# Patient Record
Sex: FEMALE | Race: WHITE | NOT HISPANIC OR LATINO | Employment: OTHER | URBAN - METROPOLITAN AREA
[De-identification: names, ages, dates, MRNs, and addresses within clinical notes are randomized per-mention and may not be internally consistent; named-entity substitution may affect disease eponyms.]

---

## 2022-06-29 ENCOUNTER — OFFICE VISIT (OUTPATIENT)
Dept: GASTROENTEROLOGY | Facility: CLINIC | Age: 57
End: 2022-06-29

## 2022-06-29 VITALS
WEIGHT: 229 LBS | DIASTOLIC BLOOD PRESSURE: 72 MMHG | HEIGHT: 67 IN | SYSTOLIC BLOOD PRESSURE: 129 MMHG | HEART RATE: 80 BPM | BODY MASS INDEX: 35.94 KG/M2

## 2022-06-29 DIAGNOSIS — R94.5 ABNORMAL RESULTS OF LIVER FUNCTION STUDIES: ICD-10-CM

## 2022-06-29 DIAGNOSIS — K76.0 FATTY LIVER: Primary | ICD-10-CM

## 2022-06-29 DIAGNOSIS — R74.8 ELEVATED LIVER ENZYMES: ICD-10-CM

## 2022-06-29 PROCEDURE — 99204 OFFICE O/P NEW MOD 45 MIN: CPT | Performed by: NURSE PRACTITIONER

## 2022-06-29 RX ORDER — FUROSEMIDE 20 MG/1
TABLET ORAL
COMMUNITY
Start: 2022-06-06

## 2022-06-29 RX ORDER — FLUTICASONE PROPIONATE AND SALMETEROL 100; 50 UG/1; UG/1
POWDER RESPIRATORY (INHALATION)
COMMUNITY
Start: 2022-04-22 | End: 2023-03-20

## 2022-06-29 RX ORDER — ROPINIROLE 2 MG/1
TABLET, FILM COATED ORAL NIGHTLY
COMMUNITY
Start: 2022-06-20

## 2022-06-29 RX ORDER — BUDESONIDE, GLYCOPYRROLATE, AND FORMOTEROL FUMARATE 160; 9; 4.8 UG/1; UG/1; UG/1
AEROSOL, METERED RESPIRATORY (INHALATION)
COMMUNITY
Start: 2022-05-23

## 2022-06-29 RX ORDER — CYCLOBENZAPRINE HCL 10 MG
TABLET ORAL
COMMUNITY
Start: 2022-06-06

## 2022-06-29 RX ORDER — ALBUTEROL SULFATE 90 UG/1
AEROSOL, METERED RESPIRATORY (INHALATION)
COMMUNITY
Start: 2022-06-29

## 2022-06-29 RX ORDER — NYSTATIN 100000 U/G
CREAM TOPICAL
COMMUNITY
Start: 2022-03-26

## 2022-06-29 RX ORDER — AMITRIPTYLINE HYDROCHLORIDE 50 MG/1
50 TABLET, FILM COATED ORAL 2 TIMES DAILY
COMMUNITY
Start: 2022-06-06

## 2022-06-29 RX ORDER — DULOXETIN HYDROCHLORIDE 20 MG/1
20 CAPSULE, DELAYED RELEASE ORAL 2 TIMES DAILY
COMMUNITY
Start: 2022-06-06

## 2022-06-29 RX ORDER — HYDROXYZINE HYDROCHLORIDE 25 MG/1
TABLET, FILM COATED ORAL
COMMUNITY
Start: 2022-06-20 | End: 2023-03-31

## 2022-06-29 RX ORDER — OMEPRAZOLE 20 MG/1
CAPSULE, DELAYED RELEASE ORAL
COMMUNITY
Start: 2022-06-07

## 2022-06-29 RX ORDER — CLOBETASOL PROPIONATE 0.5 MG/G
OINTMENT TOPICAL
COMMUNITY
Start: 2022-05-10

## 2022-06-29 NOTE — PROGRESS NOTES
Chief Complaint  steatosis of liver    Ester Pinon is a 56 y.o. female who presents to [facility] on referral from Amber Sy for a gastroenterology evaluation of fatty liver.      History of Present Illness    She reports recent diagnosis of fatty liver and diabetes.  She was started on metformin.      She reports drinking ETOH - about one shot of moonshine per day.  She does this for about 1/2 of the month until it runs out.  States that she was previously an alcoholic and stopped drinking heavily in 1992.  Denies history of illicit drug use and unprofessional tattoos.      Reports that she's been trying to lose weight.  She's having better luck now that she's started treatment for diabetes.         Past Medical History:   Diagnosis Date   • Diabetes mellitus (HCC)    • Fibromyalgia    • Restless leg        Past Surgical History:   Procedure Laterality Date   • COLONOSCOPY     • UPPER GASTROINTESTINAL ENDOSCOPY           Current Outpatient Medications:   •  albuterol sulfate  (90 Base) MCG/ACT inhaler, albuterol sulfate HFA 90 mcg/actuation aerosol inhaler  INHALE ONE PUFF BY MOUTH INTO LUNGS FOUR TIMES A DAY AS NEEDED FOR SHORTNESS OF AIR, Disp: , Rfl:   •  amitriptyline (ELAVIL) 50 MG tablet, amitriptyline 50 mg tablet  TAKE 1 TO 2 TABLET BY MOUTH DAILY, Disp: , Rfl:   •  Breztri Aerosphere 160-9-4.8 MCG/ACT aerosol inhaler, INHALE 2 SPRAYS INTO THE LUNGS 2 (TWO) TIMES DAILY., Disp: , Rfl:   •  clobetasol (TEMOVATE) 0.05 % ointment, APPLY TOPICALLY TO AFFECTED AREA(S) ON THE BODY TWICE A DAY UNTIL CLEAR., Disp: , Rfl:   •  cyclobenzaprine (FLEXERIL) 10 MG tablet, cyclobenzaprine 10 mg tablet  TAKE ONE TABLET BY MOUTH THREE TIMES A DAY ...MAY ADD SECOND TAB TO ONE DOSE DAILY AS NEEDED, Disp: , Rfl:   •  Diclofenac Sodium (VOLTAREN) 1 % gel gel, Apply 2 g topically to the appropriate area as directed., Disp: , Rfl:   •  DULoxetine (CYMBALTA) 20 MG capsule,  "Take 20 mg by mouth 2 (Two) Times a Day., Disp: , Rfl:   •  Fluticasone-Salmeterol (ADVAIR) 100-50 MCG/ACT DISKUS, INHALE ONE PUFF BY MOUTH INTO LUNGS EVERY TWELVE HOURS, Disp: , Rfl:   •  furosemide (LASIX) 20 MG tablet, TAKE ONE TABLET BY MOUTH EVERY MORNING AND MAY TAKE ONE TABLET IN THE EVENING AS NEEDED FOR SWELLING/ONE TAB MORNING AND IF NEEDED AT NOON, Disp: , Rfl:   •  hydrOXYzine (ATARAX) 25 MG tablet, hydroxyzine HCl 25 mg tablet  TAKE ONE-HALF OR TAKE ONE TABLET BY MOUTH THREE TIMES A DAY AS NEEDED FOR ANXIETY, Disp: , Rfl:   •  metFORMIN (GLUCOPHAGE) 1000 MG tablet, TAKE ONE TABLET BY MOUTH EVERY MORNING AND EVERY EVENING, Disp: , Rfl:   •  nystatin (MYCOSTATIN) 547057 UNIT/GM cream, APPLY TOPICALLY TO AFFECTED AREA(S) TWICE A DAY, Disp: , Rfl:   •  omeprazole (priLOSEC) 20 MG capsule, omeprazole 20 mg capsule,delayed release  TAKE 1 CAPSULE (20 MG TOTAL) BY MOUTH DAILY., Disp: , Rfl:   •  rOPINIRole (REQUIP) 2 MG tablet, , Disp: , Rfl:   •  sertraline (ZOLOFT) 50 MG tablet, sertraline 50 mg tablet  TAKE ONE TABLET BY MOUTH DAILY, Disp: , Rfl:      No Known Allergies    History reviewed. No pertinent family history.     Social History     Social History Narrative   • Not on file       Immunization:  Immunization History   Administered Date(s) Administered   • COVID-19 (PFIZER) PURPLE CAP 12/22/2021, 01/12/2022        Objective       Vital Signs:   /72 (BP Location: Left arm, Patient Position: Sitting, Cuff Size: Adult)   Pulse 80   Ht 170.2 cm (67\")   Wt 104 kg (229 lb)   BMI 35.87 kg/m²       Physical Exam  Constitutional:       General: She is not in acute distress.     Appearance: Normal appearance. She is well-developed and normal weight.   HENT:      Head: Normocephalic and atraumatic.   Eyes:      Conjunctiva/sclera: Conjunctivae normal.      Pupils: Pupils are equal, round, and reactive to light.      Visual Fields: Right eye visual fields normal and left eye visual fields normal. "   Cardiovascular:      Rate and Rhythm: Normal rate and regular rhythm.      Heart sounds: Normal heart sounds.   Pulmonary:      Effort: Pulmonary effort is normal. No retractions.      Breath sounds: Normal breath sounds and air entry.   Abdominal:      General: Bowel sounds are normal.      Palpations: Abdomen is soft.      Tenderness: There is no abdominal tenderness.      Comments: No appreciable hepatosplenomegaly or ascites   Musculoskeletal:         General: Normal range of motion.      Cervical back: Neck supple.      Right lower leg: No edema.      Left lower leg: No edema.   Lymphadenopathy:      Cervical: No cervical adenopathy.   Skin:     General: Skin is warm and dry.      Findings: No lesion.   Neurological:      General: No focal deficit present.      Mental Status: She is alert and oriented to person, place, and time.   Psychiatric:         Mood and Affect: Mood and affect normal.         Behavior: Behavior normal.         Result Review :   The following data was reviewed by: MONIK Ness on 06/29/2022:      CMP    CMP 3/15/22   Glucose 116 (A)   BUN 14   Creatinine 0.7   Sodium 137   Potassium 4.4   Chloride 103   Calcium 8.9   Albumin 4.2   Total Bilirubin 0.37   Alkaline Phosphatase 84   AST (SGOT) 43 (A)   ALT (SGPT) 56 (A)   (A) Abnormal value            5/6/2022 CT abdomen pelvis with and without contrast-decreased attenuation of the liver consistent with steatosis.  Normal gallbladder and biliary system.  Normal visualized stomach, small intestine and colon.                  Assessment and Plan    Diagnoses and all orders for this visit:    1. Fatty liver (Primary)  -     Liver Elastography; Future  -     Hepatitis Panel, Acute  -     ALEX  -     Alpha - 1 - Antitrypsin  -     Anti-Smooth Muscle Antibody Titer  -     Ceruloplasmin  -     Immunofixation, Serum; Future  -     Iron Profile; Future  -     Mitochondrial Antibodies, M2  -     Comprehensive Metabolic Panel; Future    2.  Elevated liver enzymes  -     Hepatitis Panel, Acute  -     ALEX  -     Alpha - 1 - Antitrypsin  -     Anti-Smooth Muscle Antibody Titer  -     Ceruloplasmin  -     Immunofixation, Serum; Future  -     Iron Profile; Future  -     Mitochondrial Antibodies, M2  -     Comprehensive Metabolic Panel; Future    3. Abnormal results of liver function studies   -     Hepatitis Panel, Acute      Encouraged alcohol cessation, compliance with diabetes treatment and low-fat diet.        Follow Up   Return in about 6 months (around 12/29/2022) for fatty liver.  Patient was given instructions and counseling regarding her condition or for health maintenance advice. Please see specific information pulled into the AVS if appropriate.

## 2022-07-08 ENCOUNTER — PATIENT ROUNDING (BHMG ONLY) (OUTPATIENT)
Dept: GASTROENTEROLOGY | Facility: CLINIC | Age: 57
End: 2022-07-08

## 2022-07-08 NOTE — PROGRESS NOTES
7/8/2022      Hello, may I speak with Ester Zi     My name is Loly, Clinical Coordinator. I am calling from [facility].    Before we get started may I verify your date of birth? 1965     I am calling to officially welcome you to our practice and ask about your recent visit. Is this a good time to talk? Yes     Tell me about your visit with us. What things went well? Patient states that things went well. The patient states that she had a great experience all around.       We're always looking for ways to make our patients' experiences even better. Do you have recommendations on ways we may improve? No     Overall were you satisfied with your first visit to our practice? Yes    I appreciate you taking the time to speak with me today. Is there anything else I can do for you? No     We would really appreciate you completing a survey if you receive one in the mail or via email.       Thank you, and have a great day.

## 2022-07-22 ENCOUNTER — PROCEDURE VISIT (OUTPATIENT)
Dept: OTHER | Facility: HOSPITAL | Age: 57
End: 2022-07-22

## 2022-07-22 DIAGNOSIS — K76.0 FATTY LIVER: ICD-10-CM

## 2022-07-22 PROCEDURE — 91200 LIVER ELASTOGRAPHY: CPT | Performed by: NURSE PRACTITIONER

## 2022-07-26 NOTE — PROGRESS NOTES
Liver Elastography  Performed by: Isabella Vivar APRN  Authorized by: Isabella Vivar APRN   Ordering Provider: Isabella Vivar APRN    Probe:  M+  Procedure Details:  Procedure: After providing an oral and written explanation of the Fibroscan vibration controlled transient elastography (VTCE) test procedure to the patient. The patient was placed in supine position with right arm in maximum abduction to allow optimal exposure of right lateral abdomen. Patient was briefly assessed, identifying terminus of the xyphoid process and locating an ideal transient elastography testing site, midline and lateral to this point. Patient was instructed to breathe normally and remain stationary during the test process. Pre-measurement data confirmed the transient elastography probe was centered over the liver parenchyma. A series of ten 50 Hz mechanical pulses were applied with controlled application pressure to induce a mechanical shear wave in the liver tissue. For each measurement, the shear wave propagation speed was detected, displayed and converted to its equivalent liver stiffness value in kilopascals. Skin to liver capsules distance and shear wave characteristics were monitored during the entire examination to assure quality data. Median liver stiffness measurement and interquartile range were calculated and displayed in real time. Acquired measurement data was stored and submitted to the provider for review and interpretation. Patient tolerated the procedure well and was discharged without incident.   Clinical Information:     NPO 3 Hours or More: Yes      Actively Drinking: No    Findings:     Median Liver Stiffness Score:  35.1    Interquartile Range (IQR) to Median Ratio:  6    Zayra Stiffness Consistent with:  F4 Cirrhosis    Current Scan Considered Reliab: Yes      Median Controlled Attenuation Parameter (dB/m):  275    IQR:  8    CAP SCORE:  Moderate/severe liver fat

## 2022-07-28 ENCOUNTER — TELEPHONE (OUTPATIENT)
Dept: GASTROENTEROLOGY | Facility: CLINIC | Age: 57
End: 2022-07-28

## 2022-07-28 NOTE — TELEPHONE ENCOUNTER
I spoke with the lab at [facility] and they say that they never received an order for a Hep Panel, if you would like to put an order in I can call the patient and have them go back for a redraw.

## 2022-07-28 NOTE — TELEPHONE ENCOUNTER
----- Message from MONIK Ness sent at 7/26/2022 12:59 PM EDT -----  Fibroscan is c/w severe fatty liver disease and stiffening of the liver c/w cirrhosis.  Will discuss further at f/u.  Recommend avoidance of high fat foods and alcohol.

## 2022-07-29 ENCOUNTER — TELEPHONE (OUTPATIENT)
Dept: GASTROENTEROLOGY | Facility: CLINIC | Age: 57
End: 2022-07-29

## 2023-01-04 ENCOUNTER — TELEPHONE (OUTPATIENT)
Dept: GASTROENTEROLOGY | Facility: CLINIC | Age: 58
End: 2023-01-04

## 2023-01-04 ENCOUNTER — OFFICE VISIT (OUTPATIENT)
Dept: GASTROENTEROLOGY | Facility: CLINIC | Age: 58
End: 2023-01-04
Payer: MEDICARE

## 2023-01-04 VITALS
WEIGHT: 224 LBS | DIASTOLIC BLOOD PRESSURE: 79 MMHG | HEART RATE: 88 BPM | BODY MASS INDEX: 35.16 KG/M2 | SYSTOLIC BLOOD PRESSURE: 114 MMHG | HEIGHT: 67 IN

## 2023-01-04 DIAGNOSIS — K74.69 OTHER CIRRHOSIS OF LIVER: Primary | ICD-10-CM

## 2023-01-04 DIAGNOSIS — R74.8 ELEVATED LIVER ENZYMES: ICD-10-CM

## 2023-01-04 PROCEDURE — 99213 OFFICE O/P EST LOW 20 MIN: CPT | Performed by: NURSE PRACTITIONER

## 2023-01-04 PROCEDURE — 1160F RVW MEDS BY RX/DR IN RCRD: CPT | Performed by: NURSE PRACTITIONER

## 2023-01-04 PROCEDURE — 1159F MED LIST DOCD IN RCRD: CPT | Performed by: NURSE PRACTITIONER

## 2023-01-04 RX ORDER — ONDANSETRON 4 MG/1
4 TABLET, ORALLY DISINTEGRATING ORAL EVERY 8 HOURS PRN
COMMUNITY
Start: 2022-10-31 | End: 2023-11-01

## 2023-01-04 NOTE — PROGRESS NOTES
Chief Complaint     fatty liver    History of Present Illness     Ester Pinon is a 57 y.o. female who presents to [facility] for follow-up of fatty liver and elevated liver enzymes.      She reports that she stopped drinking ETOH routinely in 1992.  She's only had two ETOH drinks since last visit.  She is diabetic and reports compliance with metformin.      She reports previous EGD and colonsocopy last year at [place].   History      Past Medical History:   Diagnosis Date   • Diabetes mellitus (HCC)    • Fatty liver    • Fibromyalgia    • Restless leg      Past Surgical History:   Procedure Laterality Date   • COLONOSCOPY     • UPPER GASTROINTESTINAL ENDOSCOPY       Family History   Problem Relation Age of Onset   • Colon cancer Neg Hx         Current Medications       Current Outpatient Medications:   •  albuterol sulfate  (90 Base) MCG/ACT inhaler, albuterol sulfate HFA 90 mcg/actuation aerosol inhaler  INHALE ONE PUFF BY MOUTH INTO LUNGS FOUR TIMES A DAY AS NEEDED FOR SHORTNESS OF AIR, Disp: , Rfl:   •  amitriptyline (ELAVIL) 50 MG tablet, amitriptyline 50 mg tablet  TAKE 1 TO 2 TABLET BY MOUTH DAILY, Disp: , Rfl:   •  Breztri Aerosphere 160-9-4.8 MCG/ACT aerosol inhaler, INHALE 2 SPRAYS INTO THE LUNGS 2 (TWO) TIMES DAILY., Disp: , Rfl:   •  clobetasol (TEMOVATE) 0.05 % ointment, APPLY TOPICALLY TO AFFECTED AREA(S) ON THE BODY TWICE A DAY UNTIL CLEAR., Disp: , Rfl:   •  cyclobenzaprine (FLEXERIL) 10 MG tablet, cyclobenzaprine 10 mg tablet  TAKE ONE TABLET BY MOUTH THREE TIMES A DAY ...MAY ADD SECOND TAB TO ONE DOSE DAILY AS NEEDED, Disp: , Rfl:   •  Diclofenac Sodium (VOLTAREN) 1 % gel gel, Apply 2 g topically to the appropriate area as directed., Disp: , Rfl:   •  DULoxetine (CYMBALTA) 20 MG capsule, Take 20 mg by mouth 2 (Two) Times a Day., Disp: , Rfl:   •  Fluticasone-Salmeterol (ADVAIR) 100-50 MCG/ACT DISKUS, INHALE ONE PUFF BY MOUTH INTO LUNGS EVERY TWELVE  HOURS, Disp: , Rfl:   •  furosemide (LASIX) 20 MG tablet, TAKE ONE TABLET BY MOUTH EVERY MORNING AND MAY TAKE ONE TABLET IN THE EVENING AS NEEDED FOR SWELLING/ONE TAB MORNING AND IF NEEDED AT NOON, Disp: , Rfl:   •  hydrOXYzine (ATARAX) 25 MG tablet, hydroxyzine HCl 25 mg tablet  TAKE ONE-HALF OR TAKE ONE TABLET BY MOUTH THREE TIMES A DAY AS NEEDED FOR ANXIETY, Disp: , Rfl:   •  metFORMIN (GLUCOPHAGE) 1000 MG tablet, TAKE ONE TABLET BY MOUTH EVERY MORNING AND EVERY EVENING, Disp: , Rfl:   •  nystatin (MYCOSTATIN) 670266 UNIT/GM cream, APPLY TOPICALLY TO AFFECTED AREA(S) TWICE A DAY, Disp: , Rfl:   •  omeprazole (priLOSEC) 20 MG capsule, omeprazole 20 mg capsule,delayed release  TAKE 1 CAPSULE (20 MG TOTAL) BY MOUTH DAILY., Disp: , Rfl:   •  ondansetron ODT (ZOFRAN-ODT) 4 MG disintegrating tablet, Take 4 mg by mouth., Disp: , Rfl:   •  rOPINIRole (REQUIP) 2 MG tablet, , Disp: , Rfl:   •  sertraline (ZOLOFT) 50 MG tablet, sertraline 50 mg tablet  TAKE ONE TABLET BY MOUTH DAILY, Disp: , Rfl:      Allergies     No Known Allergies    Social History       Social History     Social History Narrative   • Not on file         Objective       /79 (BP Location: Left arm, Patient Position: Sitting, Cuff Size: Adult)   Pulse 88   Ht 170.2 cm (67\")   Wt 102 kg (224 lb)   BMI 35.08 kg/m²       Physical Exam    Results       Result Review :    The following data was reviewed by: MONIK Ness on 01/04/2023:      CMP    CMP 3/15/22   Glucose 116 (A)   BUN 14   Creatinine 0.7   Sodium 137   Potassium 4.4   Chloride 103   Calcium 8.9   Albumin 4.2   Total Bilirubin 0.37   Alkaline Phosphatase 84   AST (SGOT) 43 (A)   ALT (SGPT) 56 (A)   (A) Abnormal value                   Assessment and Plan              Diagnoses and all orders for this visit:    1. Other cirrhosis of liver (HCC) (Primary)  -     CBC Auto Differential; Future  -     Protime-INR; Future  -     Comprehensive Metabolic Panel; Future  -     US  Abdomen Limited; Future    2. Elevated liver enzymes  -     Hepatitis Panel, Acute; Future      Cirrhosis - etiology - previous ETOH and diabetes.    Ascites:  None on exam.    Varices: Requested previous EGD from [facility] [place].   Encephalopathy:  No symptoms at this time.        Follow Up     Follow Up   Return in about 6 months (around 7/4/2023) for Cirrhosis.  Patient was given instructions and counseling regarding her condition or for health maintenance advice. Please see specific information pulled into the AVS if appropriate.

## 2023-01-04 NOTE — TELEPHONE ENCOUNTER
Attempted to call patient left voicemail. US has been scheduled at [place] on 01/25/2023 arrival time at 11:40 am. NPO after midnight no gum or mints.

## 2023-01-23 ENCOUNTER — TELEPHONE (OUTPATIENT)
Dept: GASTROENTEROLOGY | Facility: CLINIC | Age: 58
End: 2023-01-23
Payer: MEDICARE

## 2023-02-02 ENCOUNTER — TELEPHONE (OUTPATIENT)
Dept: GASTROENTEROLOGY | Facility: CLINIC | Age: 58
End: 2023-02-02
Payer: MEDICARE

## 2023-02-02 NOTE — TELEPHONE ENCOUNTER
Patient notified of results and is not experiencing abdominal pain at this time but will let us know if she does.

## 2023-02-02 NOTE — TELEPHONE ENCOUNTER
----- Message from MONIK Ness sent at 2/1/2023  1:05 PM EST -----  Please let pt know that US shows fatty liver and stones in the gallbladder.  Is she experiencing abdominal pain?  If so, recommend surgical referral.  If no pain, okay to continue to monitor.

## 2023-02-03 NOTE — TELEPHONE ENCOUNTER
Patient called the office and left a voicemail at 9:18 am about surgery for gallbladder.     Returned patient voicemail, patient stated that she is still worried but she is ready for the referral to surgery. Informed patient that the provider would have to place a referral but once that was placed we would contact her and she would be set up for a consultation.

## 2023-02-06 ENCOUNTER — TELEPHONE (OUTPATIENT)
Dept: GASTROENTEROLOGY | Facility: CLINIC | Age: 58
End: 2023-02-06
Payer: MEDICARE

## 2023-02-06 DIAGNOSIS — K80.20 CALCULUS OF GALLBLADDER WITHOUT CHOLECYSTITIS WITHOUT OBSTRUCTION: Primary | ICD-10-CM

## 2023-02-06 NOTE — TELEPHONE ENCOUNTER
Attempted to contact pt in regards to overdue results. Patient not able to receive calls at this time. Order postponed for follow up.

## 2023-02-21 ENCOUNTER — TELEPHONE (OUTPATIENT)
Dept: GASTROENTEROLOGY | Facility: CLINIC | Age: 58
End: 2023-02-21
Payer: MEDICARE

## 2023-02-28 ENCOUNTER — PREP FOR SURGERY (OUTPATIENT)
Dept: OTHER | Facility: HOSPITAL | Age: 58
End: 2023-02-28
Payer: MEDICARE

## 2023-02-28 ENCOUNTER — OFFICE VISIT (OUTPATIENT)
Dept: SURGERY | Facility: CLINIC | Age: 58
End: 2023-02-28
Payer: MEDICARE

## 2023-02-28 VITALS — RESPIRATION RATE: 16 BRPM | WEIGHT: 218 LBS | BODY MASS INDEX: 34.21 KG/M2 | HEIGHT: 67 IN

## 2023-02-28 DIAGNOSIS — K80.20 CHOLELITHIASIS: Primary | ICD-10-CM

## 2023-02-28 DIAGNOSIS — K80.20 CALCULUS OF GALLBLADDER WITHOUT CHOLECYSTITIS WITHOUT OBSTRUCTION: Primary | ICD-10-CM

## 2023-02-28 PROCEDURE — 99204 OFFICE O/P NEW MOD 45 MIN: CPT | Performed by: SURGERY

## 2023-02-28 RX ORDER — SODIUM CHLORIDE 9 MG/ML
40 INJECTION, SOLUTION INTRAVENOUS AS NEEDED
Status: CANCELLED | OUTPATIENT
Start: 2023-02-28

## 2023-02-28 RX ORDER — SODIUM CHLORIDE 0.9 % (FLUSH) 0.9 %
10 SYRINGE (ML) INJECTION AS NEEDED
Status: CANCELLED | OUTPATIENT
Start: 2023-02-28

## 2023-02-28 RX ORDER — INDOCYANINE GREEN AND WATER 25 MG
2.5 KIT INJECTION ONCE
Status: CANCELLED | OUTPATIENT
Start: 2023-02-28 | End: 2023-02-28

## 2023-02-28 RX ORDER — DICLOFENAC SODIUM 75 MG/1
TABLET, DELAYED RELEASE ORAL
COMMUNITY
Start: 2023-02-07

## 2023-02-28 RX ORDER — SODIUM CHLORIDE 0.9 % (FLUSH) 0.9 %
10 SYRINGE (ML) INJECTION EVERY 12 HOURS SCHEDULED
Status: CANCELLED | OUTPATIENT
Start: 2023-02-28

## 2023-02-28 RX ORDER — ATORVASTATIN CALCIUM 20 MG/1
TABLET, FILM COATED ORAL
COMMUNITY
Start: 2023-02-14

## 2023-02-28 RX ORDER — ERGOCALCIFEROL 1.25 MG/1
50000 CAPSULE ORAL WEEKLY
COMMUNITY
Start: 2023-02-14

## 2023-03-20 ENCOUNTER — PATIENT ROUNDING (BHMG ONLY) (OUTPATIENT)
Dept: NEUROLOGY | Facility: CLINIC | Age: 58
End: 2023-03-20
Payer: MEDICARE

## 2023-03-20 ENCOUNTER — OFFICE VISIT (OUTPATIENT)
Dept: NEUROLOGY | Facility: CLINIC | Age: 58
End: 2023-03-20
Payer: MEDICARE

## 2023-03-20 VITALS
HEART RATE: 79 BPM | WEIGHT: 220 LBS | DIASTOLIC BLOOD PRESSURE: 75 MMHG | HEIGHT: 67 IN | BODY MASS INDEX: 34.53 KG/M2 | SYSTOLIC BLOOD PRESSURE: 127 MMHG

## 2023-03-20 DIAGNOSIS — G56.03 BILATERAL CARPAL TUNNEL SYNDROME: Primary | ICD-10-CM

## 2023-03-20 PROCEDURE — 99202 OFFICE O/P NEW SF 15 MIN: CPT | Performed by: PSYCHIATRY & NEUROLOGY

## 2023-03-20 NOTE — PROGRESS NOTES
"Chief Complaint  Numbness (BUE/BLE)    Subjective          Ester Pinon is a 57 y.o. female who presents to [facility]  History of Present Illness  57-year-old woman evaluated for bilateral hand numbness and tingling left greater than right for the last 2 years.  She states that it happens several times a day especially at night when she wakes up gets numb and tingly.  Happens during the daytime as well when she is doing activities of daily living.  She is dropping things as well.  She has been wearing wrist splints at night.  She is here today for nerve conduction study.  Objective   Vital Signs:   /75   Pulse 79   Ht 170.2 cm (67\")   Wt 99.8 kg (220 lb)   BMI 34.46 kg/m²     Physical Exam   There is no weakness of the upper extremity on individual muscle testing.  Phalen sign is positive bilaterally greater in the left hand.  Pressure to the wrist produces tingling in her left hand.        Assessment and Plan  Diagnoses and all orders for this visit:    1. Bilateral carpal tunnel syndrome (Primary)  Assessment & Plan:  Nerve conduction study is abnormal and shows electrophysiologic evidence for moderate to severe left carpal tunnel syndrome and moderate right carpal tunnel syndrome.         Nerve Conduction Study:  7 nerves     EMG:  Not done    Total time spent with the patient and coordinating patient care was 20 minutes.    Follow Up  No follow-ups on file.  Patient was given instructions and counseling regarding her condition or for health maintenance advice. Please see specific information pulled into the AVS if appropriate.       "

## 2023-03-20 NOTE — ASSESSMENT & PLAN NOTE
Nerve conduction study is abnormal and shows electrophysiologic evidence for moderate to severe left carpal tunnel syndrome and moderate right carpal tunnel syndrome.

## 2023-03-21 DIAGNOSIS — K74.69 OTHER CIRRHOSIS OF LIVER: ICD-10-CM

## 2023-03-21 DIAGNOSIS — R74.8 ELEVATED LIVER ENZYMES: ICD-10-CM

## 2023-03-30 ENCOUNTER — OFFICE VISIT (OUTPATIENT)
Dept: ORTHOPEDIC SURGERY | Facility: CLINIC | Age: 58
End: 2023-03-30
Payer: MEDICARE

## 2023-03-30 ENCOUNTER — PREP FOR SURGERY (OUTPATIENT)
Dept: OTHER | Facility: HOSPITAL | Age: 58
End: 2023-03-30
Payer: MEDICARE

## 2023-03-30 VITALS — HEIGHT: 67 IN | BODY MASS INDEX: 34.53 KG/M2 | WEIGHT: 220 LBS

## 2023-03-30 DIAGNOSIS — G56.03 BILATERAL CARPAL TUNNEL SYNDROME: Primary | ICD-10-CM

## 2023-03-30 RX ORDER — CEFAZOLIN SODIUM IN 0.9 % NACL 3 G/100 ML
3 INTRAVENOUS SOLUTION, PIGGYBACK (ML) INTRAVENOUS ONCE
OUTPATIENT
Start: 2023-03-30 | End: 2023-03-30

## 2023-03-30 RX ORDER — CEFAZOLIN SODIUM 2 G/100ML
2 INJECTION, SOLUTION INTRAVENOUS ONCE
OUTPATIENT
Start: 2023-03-30 | End: 2023-03-30

## 2023-03-30 NOTE — PROGRESS NOTES
"Chief Complaint  Initial Evaluation of the Right Hand and Initial Evaluation of the Left Hand     Subjective      Ester Pinon presents to [facility] for initial evaluation of bilateral hands. She has numbness and tingling in bilateral hands.  Her left one is worse then the right.  She has had this for years and notes she has braced and gives some relief.  She notes dropping items and difficulty with FMC.  She is here today to discuss left carpal tunnel surgery.     No Known Allergies     Social History     Socioeconomic History   • Marital status:    Tobacco Use   • Smoking status: Former     Packs/day: 1.50     Years: 30.00     Pack years: 45.00     Types: Cigarettes     Start date: 1984     Quit date: 2020     Years since quittin.9   • Smokeless tobacco: Never   • Tobacco comments:     smoked on and off quit when I got pregnant than started back up   Vaping Use   • Vaping Use: Never used   Substance and Sexual Activity   • Alcohol use: Not Currently     Comment: Had to stop cause of liver   • Drug use: Never   • Sexual activity: Not Currently     Partners: Male        Review of Systems     Objective   Vital Signs:   Ht 170.2 cm (67\")   Wt 99.8 kg (220 lb)   BMI 34.46 kg/m²       Physical Exam  Constitutional:       Appearance: Normal appearance. Patient is well-developed and normal weight.   HENT:      Head: Normocephalic.      Right Ear: Hearing and external ear normal.      Left Ear: Hearing and external ear normal.      Nose: Nose normal.   Eyes:      Conjunctiva/sclera: Conjunctivae normal.   Cardiovascular:      Rate and Rhythm: Normal rate.   Pulmonary:      Effort: Pulmonary effort is normal.      Breath sounds: No wheezing or rales.   Abdominal:      Palpations: Abdomen is soft.      Tenderness: There is no abdominal tenderness.   Musculoskeletal:      Cervical back: Normal range of motion.   Skin:     Findings: No rash.   Neurological:      Mental " Status: Patient is alert and oriented to person, place, and time.   Psychiatric:         Mood and Affect: Mood and affect normal.         Judgment: Judgment normal.       Ortho Exam      BILATERAL HANDS Negative Compression testing/ Positive Tinels. NegativeFinkelsteins. Negative Salazar's testing. Negative CMC grind testing. Positive Phalens. Full ROM of the hand, fingers, elbow and wrist. Negative Triggering of the digit. Sensation grossly intact to light touch, median, radial and ulnar nerve. Positive AIN, PIN and ulnar nerve motor function intact. Axillary nerve intact. Positive pulses.         Procedures      Imaging Results (Most Recent)     None           Result Review :     3/20/23 EMG   Nerve conduction study is abnormal and shows electrophysiologic evidence for moderate to severe left carpal tunnel syndrome and moderate right carpal tunnel syndrome     Assessment and Plan     Diagnoses and all orders for this visit:    1. Bilateral carpal tunnel syndrome (Primary)        Discussed the treatment plan with the patient. I reviewed the EMG with the patients. Discussed the treatment options with the patient, operative vs non-operative. The patient expressed understanding and wished to proceed wit a left carpal tunnel surgery.     Educated on risk of smoking. Discussed options for smoking cessation., Discussed surgery., Risks/benefits discussed with patient including, but not limited to: infection, bleeding, neurovascular damage, malunion, nonunion, aesthetic deformity, need for further surgery, and death., Surgery pamphlet given. and Call or return if worsening symptoms.    Follow Up     2 weeks postoperatively     Patient was given instructions and counseling regarding her condition or for health maintenance advice. Please see specific information pulled into the AVS if appropriate.     Scribed for Libra Guaman MD by Joanna Mays MA.  03/30/23   14:00 EDT    I have personally performed the services  described in this document as scribed by the above individual and it is both accurate and complete. Libra Guaman MD 03/30/23    3

## 2023-03-31 ENCOUNTER — TELEPHONE (OUTPATIENT)
Dept: GASTROENTEROLOGY | Facility: CLINIC | Age: 58
End: 2023-03-31
Payer: MEDICARE

## 2023-03-31 RX ORDER — HYDROXYZINE 50 MG/1
50 TABLET, FILM COATED ORAL 3 TIMES DAILY PRN
COMMUNITY
Start: 2023-03-23

## 2023-03-31 NOTE — PRE-PROCEDURE INSTRUCTIONS
IMPORTANT INSTRUCTIONS - PRE-ADMISSION TESTING  1. DO NOT EAT OR CHEW anything after midnight the night before your procedure.    2. You may have CLEAR liquids up to _____2_ hours prior to ARRIVAL time.   3. Take the following medications the morning of your procedure with JUST A SIP OF WATER:  _______ALBUTEROL INHALER IF NEEDED AND BRING WITH YOU, AMITRIPTYLINE, BREZTRI INHALER AND BRING WITH YOU, FLEXERIL IF NEEDED, HYDROXYZINE IF NEEDED, OMEPRAZOLE, ZOFRAN IF NEEDED________________________________________________________________________________________________________________________________________________________________________________    4. DO NOT BRING your medications to the hospital with you, UNLESS something has changed since your PRE-Admission Testing appointment.  5. Hold all vitamins, supplements, and NSAIDS (Non- steroidal anti-inflammatory meds) for one week prior to surgery (you MAY take Tylenol or Acetaminophen).  6. If you are diabetic, check your blood sugar the morning of your procedure. If it is less than 70 or if you are feeling symptomatic, call the following number for further instructions: [phone]_.  7. Use your inhalers/nebulizers as usual, the morning of your procedure. BRING YOUR INHALERS with you.   8. Bring your CPAP or BIPAP to hospital, ONLY IF YOU WILL BE SPENDING THE NIGHT.   9. Make sure you have a ride home and have someone who will stay with you the day of your procedure after you go home.  10. If you have any questions, please call your Pre-Admission Testing Nurse, ___DAWARSEN_____________ at [phone]____________.   11. Per anesthesia request, do not smoke for 24 hours before your procedure or as instructed by your surgeon.    12. BATHING INSTRUCTIONS GIVEN. NO JEWELRY DAY OF PROCEDURE. NO NAIL POLISH UPPER OR LOWER EXTREMITIES.  13. NO METFORMIN AFTER 6 PM ON 4/2/23  14. ENTRANCE A ELEVATOR A 3RD FLOOR

## 2023-03-31 NOTE — TELEPHONE ENCOUNTER
----- Message from MONIK Ness sent at 3/30/2023 10:32 PM EDT -----  Labs normal.  Acute hep panel negative for viral hepatitis.

## 2023-04-03 ENCOUNTER — ANESTHESIA EVENT (OUTPATIENT)
Dept: PERIOP | Facility: HOSPITAL | Age: 58
End: 2023-04-03
Payer: MEDICARE

## 2023-04-03 ENCOUNTER — HOSPITAL ENCOUNTER (OUTPATIENT)
Facility: HOSPITAL | Age: 58
Setting detail: HOSPITAL OUTPATIENT SURGERY
Discharge: HOME | End: 2023-04-03
Attending: SURGERY | Admitting: SURGERY
Payer: MEDICARE

## 2023-04-03 ENCOUNTER — ANESTHESIA (OUTPATIENT)
Dept: PERIOP | Facility: HOSPITAL | Age: 58
End: 2023-04-03
Payer: MEDICARE

## 2023-04-03 VITALS
HEART RATE: 76 BPM | SYSTOLIC BLOOD PRESSURE: 121 MMHG | TEMPERATURE: 97.6 F | WEIGHT: 223.77 LBS | RESPIRATION RATE: 20 BRPM | HEIGHT: 67 IN | BODY MASS INDEX: 35.12 KG/M2 | OXYGEN SATURATION: 93 % | DIASTOLIC BLOOD PRESSURE: 70 MMHG

## 2023-04-03 DIAGNOSIS — K80.20 CHOLELITHIASIS: ICD-10-CM

## 2023-04-03 DIAGNOSIS — K80.20 CALCULUS OF GALLBLADDER WITHOUT CHOLECYSTITIS WITHOUT OBSTRUCTION: ICD-10-CM

## 2023-04-03 LAB
GLUCOSE BLDC GLUCOMTR-MCNC: 111 MG/DL (ref 70–99)
GLUCOSE BLDC GLUCOMTR-MCNC: 131 MG/DL (ref 70–99)

## 2023-04-03 PROCEDURE — 25010000002 ONDANSETRON PER 1 MG: Performed by: NURSE ANESTHETIST, CERTIFIED REGISTERED

## 2023-04-03 PROCEDURE — 88304 TISSUE EXAM BY PATHOLOGIST: CPT | Performed by: SURGERY

## 2023-04-03 PROCEDURE — 0 LIDOCAINE 1 % SOLUTION 10 ML VIAL: Performed by: SURGERY

## 2023-04-03 PROCEDURE — 82962 GLUCOSE BLOOD TEST: CPT

## 2023-04-03 PROCEDURE — 25010000002 DEXAMETHASONE PER 1 MG: Performed by: NURSE ANESTHETIST, CERTIFIED REGISTERED

## 2023-04-03 PROCEDURE — S2900 ROBOTIC SURGICAL SYSTEM: HCPCS | Performed by: SPECIALIST/TECHNOLOGIST, OTHER

## 2023-04-03 PROCEDURE — 25010000002 CEFOXITIN PER 1 G: Performed by: SURGERY

## 2023-04-03 PROCEDURE — 25010000002 MIDAZOLAM PER 1MG: Performed by: ANESTHESIOLOGY

## 2023-04-03 PROCEDURE — S2900 ROBOTIC SURGICAL SYSTEM: HCPCS | Performed by: SURGERY

## 2023-04-03 PROCEDURE — 93005 ELECTROCARDIOGRAM TRACING: CPT | Performed by: ANESTHESIOLOGY

## 2023-04-03 PROCEDURE — 25010000002 PROPOFOL 10 MG/ML EMULSION: Performed by: NURSE ANESTHETIST, CERTIFIED REGISTERED

## 2023-04-03 PROCEDURE — 25010000002 FENTANYL CITRATE (PF) 50 MCG/ML SOLUTION: Performed by: NURSE ANESTHETIST, CERTIFIED REGISTERED

## 2023-04-03 PROCEDURE — 93010 ELECTROCARDIOGRAM REPORT: CPT | Performed by: INTERNAL MEDICINE

## 2023-04-03 PROCEDURE — 47563 LAPARO CHOLECYSTECTOMY/GRAPH: CPT | Performed by: SPECIALIST/TECHNOLOGIST, OTHER

## 2023-04-03 PROCEDURE — 25010000002 KETOROLAC TROMETHAMINE PER 15 MG: Performed by: NURSE ANESTHETIST, CERTIFIED REGISTERED

## 2023-04-03 PROCEDURE — 47563 LAPARO CHOLECYSTECTOMY/GRAPH: CPT | Performed by: SURGERY

## 2023-04-03 PROCEDURE — 25010000002 HYDROMORPHONE 1 MG/ML SOLUTION: Performed by: NURSE ANESTHETIST, CERTIFIED REGISTERED

## 2023-04-03 DEVICE — ABSORBABLE HEMOSTAT (OXIDIZED REGENERATED CELLULOSE, U.S.P.)
Type: IMPLANTABLE DEVICE | Site: ABDOMEN | Status: FUNCTIONAL
Brand: SURGICEL

## 2023-04-03 DEVICE — CLIP LIG HEMOLOK PA LG 6CT PRP: Type: IMPLANTABLE DEVICE | Site: ABDOMEN | Status: FUNCTIONAL

## 2023-04-03 RX ORDER — MEPERIDINE HYDROCHLORIDE 25 MG/ML
12.5 INJECTION INTRAMUSCULAR; INTRAVENOUS; SUBCUTANEOUS
Status: DISCONTINUED | OUTPATIENT
Start: 2023-04-03 | End: 2023-04-03 | Stop reason: HOSPADM

## 2023-04-03 RX ORDER — ONDANSETRON 4 MG/1
4 TABLET, FILM COATED ORAL EVERY 8 HOURS PRN
Qty: 10 TABLET | Refills: 0 | Status: SHIPPED | OUTPATIENT
Start: 2023-04-03

## 2023-04-03 RX ORDER — PROPOFOL 10 MG/ML
VIAL (ML) INTRAVENOUS AS NEEDED
Status: DISCONTINUED | OUTPATIENT
Start: 2023-04-03 | End: 2023-04-03 | Stop reason: SURG

## 2023-04-03 RX ORDER — GLYCOPYRROLATE 0.2 MG/ML
INJECTION INTRAMUSCULAR; INTRAVENOUS AS NEEDED
Status: DISCONTINUED | OUTPATIENT
Start: 2023-04-03 | End: 2023-04-03 | Stop reason: SURG

## 2023-04-03 RX ORDER — ROCURONIUM BROMIDE 10 MG/ML
INJECTION, SOLUTION INTRAVENOUS AS NEEDED
Status: DISCONTINUED | OUTPATIENT
Start: 2023-04-03 | End: 2023-04-03 | Stop reason: SURG

## 2023-04-03 RX ORDER — ACETAMINOPHEN 500 MG
1000 TABLET ORAL ONCE
Status: DISCONTINUED | OUTPATIENT
Start: 2023-04-03 | End: 2023-04-03

## 2023-04-03 RX ORDER — SODIUM CHLORIDE 9 MG/ML
INJECTION, SOLUTION INTRAVENOUS CONTINUOUS PRN
Status: DISCONTINUED | OUTPATIENT
Start: 2023-04-03 | End: 2023-04-03 | Stop reason: SURG

## 2023-04-03 RX ORDER — SODIUM CHLORIDE 0.9 % (FLUSH) 0.9 %
10 SYRINGE (ML) INJECTION EVERY 12 HOURS SCHEDULED
Status: DISCONTINUED | OUTPATIENT
Start: 2023-04-03 | End: 2023-04-03 | Stop reason: HOSPADM

## 2023-04-03 RX ORDER — MAGNESIUM HYDROXIDE 1200 MG/15ML
LIQUID ORAL AS NEEDED
Status: DISCONTINUED | OUTPATIENT
Start: 2023-04-03 | End: 2023-04-03 | Stop reason: HOSPADM

## 2023-04-03 RX ORDER — LIDOCAINE HYDROCHLORIDE 20 MG/ML
INJECTION, SOLUTION EPIDURAL; INFILTRATION; INTRACAUDAL; PERINEURAL AS NEEDED
Status: DISCONTINUED | OUTPATIENT
Start: 2023-04-03 | End: 2023-04-03 | Stop reason: SURG

## 2023-04-03 RX ORDER — PROMETHAZINE HYDROCHLORIDE 12.5 MG/1
25 TABLET ORAL ONCE AS NEEDED
Status: DISCONTINUED | OUTPATIENT
Start: 2023-04-03 | End: 2023-04-03 | Stop reason: HOSPADM

## 2023-04-03 RX ORDER — ONDANSETRON 2 MG/ML
INJECTION INTRAMUSCULAR; INTRAVENOUS AS NEEDED
Status: DISCONTINUED | OUTPATIENT
Start: 2023-04-03 | End: 2023-04-03 | Stop reason: SURG

## 2023-04-03 RX ORDER — SODIUM CHLORIDE 9 MG/ML
40 INJECTION, SOLUTION INTRAVENOUS AS NEEDED
Status: DISCONTINUED | OUTPATIENT
Start: 2023-04-03 | End: 2023-04-03 | Stop reason: HOSPADM

## 2023-04-03 RX ORDER — MIDAZOLAM HYDROCHLORIDE 2 MG/2ML
2 INJECTION, SOLUTION INTRAMUSCULAR; INTRAVENOUS ONCE
Status: COMPLETED | OUTPATIENT
Start: 2023-04-03 | End: 2023-04-03

## 2023-04-03 RX ORDER — OXYCODONE HYDROCHLORIDE 5 MG/1
5 TABLET ORAL
Status: DISCONTINUED | OUTPATIENT
Start: 2023-04-03 | End: 2023-04-03 | Stop reason: HOSPADM

## 2023-04-03 RX ORDER — HYDROCODONE BITARTRATE AND ACETAMINOPHEN 5; 325 MG/1; MG/1
1 TABLET ORAL ONCE AS NEEDED
Status: DISCONTINUED | OUTPATIENT
Start: 2023-04-03 | End: 2023-04-03 | Stop reason: HOSPADM

## 2023-04-03 RX ORDER — SODIUM CHLORIDE 0.9 % (FLUSH) 0.9 %
10 SYRINGE (ML) INJECTION AS NEEDED
Status: DISCONTINUED | OUTPATIENT
Start: 2023-04-03 | End: 2023-04-03 | Stop reason: HOSPADM

## 2023-04-03 RX ORDER — PROMETHAZINE HYDROCHLORIDE 25 MG/1
25 SUPPOSITORY RECTAL ONCE AS NEEDED
Status: DISCONTINUED | OUTPATIENT
Start: 2023-04-03 | End: 2023-04-03 | Stop reason: HOSPADM

## 2023-04-03 RX ORDER — HYDROCODONE BITARTRATE AND ACETAMINOPHEN 5; 325 MG/1; MG/1
1-2 TABLET ORAL EVERY 4 HOURS PRN
Qty: 20 TABLET | Refills: 0 | Status: SHIPPED | OUTPATIENT
Start: 2023-04-03

## 2023-04-03 RX ORDER — KETOROLAC TROMETHAMINE 30 MG/ML
INJECTION, SOLUTION INTRAMUSCULAR; INTRAVENOUS AS NEEDED
Status: DISCONTINUED | OUTPATIENT
Start: 2023-04-03 | End: 2023-04-03 | Stop reason: SURG

## 2023-04-03 RX ORDER — SODIUM CHLORIDE, SODIUM LACTATE, POTASSIUM CHLORIDE, CALCIUM CHLORIDE 600; 310; 30; 20 MG/100ML; MG/100ML; MG/100ML; MG/100ML
9 INJECTION, SOLUTION INTRAVENOUS CONTINUOUS PRN
Status: DISCONTINUED | OUTPATIENT
Start: 2023-04-03 | End: 2023-04-03 | Stop reason: HOSPADM

## 2023-04-03 RX ORDER — ONDANSETRON 2 MG/ML
4 INJECTION INTRAMUSCULAR; INTRAVENOUS ONCE AS NEEDED
Status: DISCONTINUED | OUTPATIENT
Start: 2023-04-03 | End: 2023-04-03 | Stop reason: HOSPADM

## 2023-04-03 RX ORDER — DEXMEDETOMIDINE HYDROCHLORIDE 100 UG/ML
INJECTION, SOLUTION INTRAVENOUS AS NEEDED
Status: DISCONTINUED | OUTPATIENT
Start: 2023-04-03 | End: 2023-04-03 | Stop reason: SURG

## 2023-04-03 RX ORDER — DEXAMETHASONE SODIUM PHOSPHATE 4 MG/ML
INJECTION, SOLUTION INTRA-ARTICULAR; INTRALESIONAL; INTRAMUSCULAR; INTRAVENOUS; SOFT TISSUE AS NEEDED
Status: DISCONTINUED | OUTPATIENT
Start: 2023-04-03 | End: 2023-04-03 | Stop reason: SURG

## 2023-04-03 RX ORDER — INDOCYANINE GREEN AND WATER 25 MG
2.5 KIT INJECTION ONCE
Status: COMPLETED | OUTPATIENT
Start: 2023-04-03 | End: 2023-04-03

## 2023-04-03 RX ORDER — KETAMINE HCL IN NACL, ISO-OSM 100MG/10ML
SYRINGE (ML) INJECTION AS NEEDED
Status: DISCONTINUED | OUTPATIENT
Start: 2023-04-03 | End: 2023-04-03 | Stop reason: SURG

## 2023-04-03 RX ORDER — FENTANYL CITRATE 50 UG/ML
INJECTION, SOLUTION INTRAMUSCULAR; INTRAVENOUS AS NEEDED
Status: DISCONTINUED | OUTPATIENT
Start: 2023-04-03 | End: 2023-04-03 | Stop reason: SURG

## 2023-04-03 RX ORDER — DOCUSATE SODIUM 100 MG/1
100 CAPSULE, LIQUID FILLED ORAL 2 TIMES DAILY PRN
Qty: 10 CAPSULE | Refills: 1 | Status: SHIPPED | OUTPATIENT
Start: 2023-04-03 | End: 2024-04-02

## 2023-04-03 RX ADMIN — INDOCYANINE GREEN AND WATER 2.5 MG: KIT at 06:24

## 2023-04-03 RX ADMIN — Medication 30 MG: at 08:45

## 2023-04-03 RX ADMIN — KETOROLAC TROMETHAMINE 30 MG: 30 INJECTION, SOLUTION INTRAMUSCULAR; INTRAVENOUS at 09:48

## 2023-04-03 RX ADMIN — ONDANSETRON 4 MG: 2 INJECTION INTRAMUSCULAR; INTRAVENOUS at 10:58

## 2023-04-03 RX ADMIN — ONDANSETRON 4 MG: 2 INJECTION INTRAMUSCULAR; INTRAVENOUS at 09:48

## 2023-04-03 RX ADMIN — Medication 2 G: at 08:43

## 2023-04-03 RX ADMIN — PROPOFOL 150 MG: 10 INJECTION, EMULSION INTRAVENOUS at 08:39

## 2023-04-03 RX ADMIN — DEXMEDETOMIDINE HYDROCHLORIDE 4 MCG: 100 INJECTION, SOLUTION, CONCENTRATE INTRAVENOUS at 09:27

## 2023-04-03 RX ADMIN — FENTANYL CITRATE 50 MCG: 50 INJECTION, SOLUTION INTRAMUSCULAR; INTRAVENOUS at 08:39

## 2023-04-03 RX ADMIN — HYDROMORPHONE HYDROCHLORIDE 0.5 MG: 1 INJECTION, SOLUTION INTRAMUSCULAR; INTRAVENOUS; SUBCUTANEOUS at 11:01

## 2023-04-03 RX ADMIN — DEXAMETHASONE SODIUM PHOSPHATE 4 MG: 4 INJECTION, SOLUTION INTRA-ARTICULAR; INTRALESIONAL; INTRAMUSCULAR; INTRAVENOUS; SOFT TISSUE at 08:44

## 2023-04-03 RX ADMIN — LIDOCAINE HYDROCHLORIDE 100 MG: 20 INJECTION, SOLUTION EPIDURAL; INFILTRATION; INTRACAUDAL; PERINEURAL at 08:39

## 2023-04-03 RX ADMIN — ROCURONIUM BROMIDE 70 MG: 10 INJECTION, SOLUTION INTRAVENOUS at 08:40

## 2023-04-03 RX ADMIN — SUGAMMADEX 200 MG: 100 INJECTION, SOLUTION INTRAVENOUS at 09:55

## 2023-04-03 RX ADMIN — MIDAZOLAM HYDROCHLORIDE 2 MG: 1 INJECTION, SOLUTION INTRAMUSCULAR; INTRAVENOUS at 08:20

## 2023-04-03 RX ADMIN — DEXMEDETOMIDINE HYDROCHLORIDE 4 MCG: 100 INJECTION, SOLUTION, CONCENTRATE INTRAVENOUS at 09:48

## 2023-04-03 RX ADMIN — OXYCODONE HYDROCHLORIDE 5 MG: 5 TABLET ORAL at 11:11

## 2023-04-03 RX ADMIN — SODIUM CHLORIDE: 9 INJECTION, SOLUTION INTRAVENOUS at 08:33

## 2023-04-03 RX ADMIN — SODIUM CHLORIDE, POTASSIUM CHLORIDE, SODIUM LACTATE AND CALCIUM CHLORIDE 9 ML/HR: 600; 310; 30; 20 INJECTION, SOLUTION INTRAVENOUS at 06:51

## 2023-04-03 RX ADMIN — GLYCOPYRROLATE 0.2 MG: 0.2 INJECTION INTRAMUSCULAR; INTRAVENOUS at 08:44

## 2023-04-03 RX ADMIN — FENTANYL CITRATE 50 MCG: 50 INJECTION, SOLUTION INTRAMUSCULAR; INTRAVENOUS at 08:59

## 2023-04-03 NOTE — ANESTHESIA PREPROCEDURE EVALUATION
Anesthesia Evaluation     Patient summary reviewed and Nursing notes reviewed                Airway   Mallampati: I  TM distance: >3 FB  Neck ROM: full  No difficulty expected  Dental    (+) lower dentures and upper dentures    Pulmonary - negative pulmonary ROS and normal exam    breath sounds clear to auscultation  Cardiovascular - normal exam    Rhythm: regular  Rate: normal    (+) hyperlipidemia,       Neuro/Psych  (+) numbness,    GI/Hepatic/Renal/Endo    (+) obesity,   liver disease, diabetes mellitus,     Musculoskeletal     (+) myalgias,   Abdominal    Substance History - negative use     OB/GYN negative ob/gyn ROS         Other                        Anesthesia Plan    ASA 3     general     intravenous induction     Anesthetic plan, risks, benefits, and alternatives have been provided, discussed and informed consent has been obtained with: patient.        CODE STATUS:

## 2023-04-03 NOTE — ANESTHESIA POSTPROCEDURE EVALUATION
Patient: Ester Pinon    Procedure Summary     Date: 04/03/23 Room / Location: [facility] OR 08 / [facility] MAIN OR    Anesthesia Start: 0833 Anesthesia Stop: 1015    Procedure: Robotic Cholecystectomy (Abdomen) Diagnosis:       Cholelithiasis      (Cholelithiasis [K80.20])    Surgeons: Leopoldo Farmer MD Provider: Smooth Young MD    Anesthesia Type: general ASA Status: 3          Anesthesia Type: general    Vitals  Vitals Value Taken Time   /77 04/03/23 1121   Temp 36.4 °C (97.5 °F) 04/03/23 1125   Pulse 78 04/03/23 1125   Resp 18 04/03/23 1125   SpO2 92 % 04/03/23 1125           Post Anesthesia Care and Evaluation    Patient location during evaluation: bedside  Patient participation: complete - patient participated  Level of consciousness: awake  Pain management: adequate    Airway patency: patent  PONV Status: none  Cardiovascular status: acceptable and stable  Respiratory status: acceptable  Hydration status: acceptable    Comments: An Anesthesiologist personally participated in the most demanding procedures (including induction and emergence if applicable) in the anesthesia plan, monitored the course of anesthesia administration at frequent intervals and remained physically present and available for immediate diagnosis and treatment of emergencies.

## 2023-04-03 NOTE — INTERVAL H&P NOTE
H&P updated. The patient was examined and the following changes are noted:        Pt evaled by anesthesia. EKG note. Cardiology called and they cleared her for OR

## 2023-04-03 NOTE — H&P
History of Present Illness  Ester Pinon is a 57 y.o. female presents to [facility] GENERAL SURGERY.     The patient is to be seen for gallstones and has a history of cirrhosis.      Gallstones   The patient reports she had a routine ultrasound to check her liver due to her history of cirrhosis, incidentally, finding the gallstones. She reports occasional pain, but it is not uncomfortable. She denies the pain radiating to her back or shoulder. She states she has been nauseous lately since starting metformin, that improves after she eats. The patient had tubal litigation in the past and denies any other abdominal surgeries. She denies being on anticoagulation medication.       Cirrhosis   The patient reports this is stable and was told it is secondary to alcohol use and diabetes. Her most recent liver enzymes were mild elevated but stable.            Objective              Past Medical History:   Diagnosis Date   • Cholelithiasis 2/28/2023   • Cirrhosis (HCC) 2023     liver   • Diabetes mellitus (HCC)     • Fatty liver     • Fibromyalgia     • Restless leg                 Past Surgical History:   Procedure Laterality Date   • COLONOSCOPY       • UPPER GASTROINTESTINAL ENDOSCOPY                Current Outpatient Medications:   •  albuterol sulfate  (90 Base) MCG/ACT inhaler, albuterol sulfate HFA 90 mcg/actuation aerosol inhaler  INHALE ONE PUFF BY MOUTH INTO LUNGS FOUR TIMES A DAY AS NEEDED FOR SHORTNESS OF AIR, Disp: , Rfl:   •  amitriptyline (ELAVIL) 50 MG tablet, amitriptyline 50 mg tablet  TAKE 1 TO 2 TABLET BY MOUTH DAILY, Disp: , Rfl:   •  atorvastatin (LIPITOR) 20 MG tablet, TAKE 1 TABLET (20 MG TOTAL) BY MOUTH EVERY EVENING., Disp: , Rfl:   •  Breztri Aerosphere 160-9-4.8 MCG/ACT aerosol inhaler, INHALE 2 SPRAYS INTO THE LUNGS 2 (TWO) TIMES DAILY., Disp: , Rfl:   •  clobetasol (TEMOVATE) 0.05 % ointment, APPLY TOPICALLY TO AFFECTED AREA(S) ON THE BODY TWICE A DAY UNTIL CLEAR.,  Disp: , Rfl:   •  cyclobenzaprine (FLEXERIL) 10 MG tablet, cyclobenzaprine 10 mg tablet  TAKE ONE TABLET BY MOUTH THREE TIMES A DAY ...MAY ADD SECOND TAB TO ONE DOSE DAILY AS NEEDED, Disp: , Rfl:   •  diclofenac (VOLTAREN) 75 MG EC tablet, TAKE 1 TABLET (75 MG TOTAL) BY MOUTH 2 (TWO) TIMES DAILY AS NEEDED (PAIN)., Disp: , Rfl:   •  Diclofenac Sodium (VOLTAREN) 1 % gel gel, Apply 2 g topically to the appropriate area as directed., Disp: , Rfl:   •  DULoxetine (CYMBALTA) 20 MG capsule, Take 1 capsule by mouth 2 (Two) Times a Day., Disp: , Rfl:   •  ergocalciferol (ERGOCALCIFEROL) 1.25 MG (74921 UT) capsule, Take 1 capsule by mouth., Disp: , Rfl:   •  furosemide (LASIX) 20 MG tablet, TAKE ONE TABLET BY MOUTH EVERY MORNING AND MAY TAKE ONE TABLET IN THE EVENING AS NEEDED FOR SWELLING/ONE TAB MORNING AND IF NEEDED AT NOON, Disp: , Rfl:   •  hydrOXYzine (ATARAX) 25 MG tablet, hydroxyzine HCl 25 mg tablet  TAKE ONE-HALF OR TAKE ONE TABLET BY MOUTH THREE TIMES A DAY AS NEEDED FOR ANXIETY, Disp: , Rfl:   •  metFORMIN (GLUCOPHAGE) 500 MG tablet, TAKE 1 TABLET (500 MG TOTAL) BY MOUTH 2 (TWO) TIMES DAILY WITH MEALS., Disp: , Rfl:   •  nystatin (MYCOSTATIN) 025469 UNIT/GM cream, APPLY TOPICALLY TO AFFECTED AREA(S) TWICE A DAY, Disp: , Rfl:   •  omeprazole (priLOSEC) 20 MG capsule, omeprazole 20 mg capsule,delayed release  TAKE 1 CAPSULE (20 MG TOTAL) BY MOUTH DAILY., Disp: , Rfl:   •  ondansetron ODT (ZOFRAN-ODT) 4 MG disintegrating tablet, Take 1 tablet by mouth., Disp: , Rfl:   •  rOPINIRole (REQUIP) 2 MG tablet, , Disp: , Rfl:   •  Fluticasone-Salmeterol (ADVAIR) 100-50 MCG/ACT DISKUS, INHALE ONE PUFF BY MOUTH INTO LUNGS EVERY TWELVE HOURS, Disp: , Rfl:   •  metFORMIN (GLUCOPHAGE) 1000 MG tablet, TAKE ONE TABLET BY MOUTH EVERY MORNING AND EVERY EVENING, Disp: , Rfl:   •  sertraline (ZOLOFT) 50 MG tablet, sertraline 50 mg tablet  TAKE ONE TABLET BY MOUTH DAILY, Disp: , Rfl:      No Known Allergies            Family History  "  Problem Relation Age of Onset   • Diabetes Mother     • Heart disease Mother     • Heart disease Father     • Heart disease Maternal Grandfather     • Cancer Maternal Grandmother           breast spread to bone   • Colon cancer Neg Hx           Social History            Socioeconomic History   • Marital status:    Tobacco Use   • Smoking status: Former       Packs/day: 1.50       Years: 30.00       Pack years: 45.00       Types: Cigarettes       Start date: 1984       Quit date: 2020       Years since quittin.8   • Smokeless tobacco: Never   • Tobacco comments:       smoked on and off quit when I got pregnant than started back up   Vaping Use   • Vaping Use: Never used   Substance and Sexual Activity   • Alcohol use: Not Currently       Alcohol/week: 1.0 standard drink       Types: 1 Shots of liquor per week       Comment: Had to stop cause of liver   • Drug use: Never   • Sexual activity: Not Currently       Partners: Male         Vital Signs:   Resp 16   Ht 170.2 cm (67\")   Wt 98.9 kg (218 lb)   BMI 34.14 kg/m²      Physical Exam  Constitutional:       Appearance: Normal appearance. She is well-developed and normal weight.   Cardiovascular:      Heart sounds: No murmur heard.  Pulmonary:      Effort: Pulmonary effort is normal.      Breath sounds: Normal air entry.   Abdominal:      Palpations: Abdomen is soft.   Neurological:      Mental Status: She is alert and oriented to person, place, and time.      Motor: Motor function is intact.                Result Review    :               Procedures            Assessment      Assessment and Plan    There are no diagnoses linked to this encounter.      1. The patient with symptomatic gallstones with the addition of cirrhosis.     I discussed risks, benefits, alternatives of the procedure and the increased risk given her cirrhosis, but it seems that she is likely to develop more symptoms and have more problems in the future, so it is best to " proceed with the cholecystectomy. We will attempt this with a robotic cholecystectomy. Risks, benefits, alternatives of the procedure were discussed extensively. All questions were answered. The patient voiced understanding and agreed to proceed with the above plan.        Follow Up   No follow-ups on file.  Patient was given instructions and counseling regarding her condition or for health maintenance advice. Please see specific information pulled into the AVS if appropriate.         Transcribed from ambient dictation for Leopoldo Farmer MD by Karissa Albright.  02/28/23   16:56 EST     Patient or patient representative verbalized consent to the visit recording.  I have personally performed the services described in this document as transcribed by the above individual, and it is both accurate and complete.

## 2023-04-03 NOTE — DISCHARGE INSTRUCTIONS
DISCHARGE INSTRUCTIONS LAPAROSCOPIC CHOLECYSTECTOMY/APPENDECTOMY  (GALL BLADDER)      For your surgery you had:  General anesthesia (you may have a sore throat for the first 24 hours)  You received an anesthesia medication today that can cause hormonal forms of birth control to be ineffective. You should use a different form of birth control (to prevent pregnancy) for 7 days.   You may experience dizziness, drowsiness, or light-headedness for several hours following surgery.  Do not stay alone tonight.  Limit your activity for 24 hours.  Resume your diet slowly.  Follow whatever special dietary instructions you may have been given by your doctor.  You should not drive, operate machinery, drink alcohol, or sign legally binding documents for 24 hours or while you are taking pain medication.    Last dose of pain medication was given at:   OXYIR 5 MG AT 11:11 AM  .    NOTIFY YOUR DOCTOR IF YOU EXPERIENCE ANY OF THE FOLLOWING:  Temperature greater than 101 degrees Fahrenheit  Shaking Chills  Redness or excessive drainage from incision  Nausea, vomiting and/or pain that is not controlled by prescribed medications  Increase in bleeding or bleeding that is excessive  Unable to urinate in 6 hours after surgery  If unable to reach your doctor, please go to the closest Emergency Room You may remove Band-Aid/dressing  SKIN ADHESIVE WILL FLAKE OFF IN 10-14 DAYS .  You may shower or bathe TUESDAY .  Apply an ice pack 24-48 hours.  You may experience gas discomfort 24-48 hours after discharge, especially in chest and shoulders.  Changing position frequently may alleviate this discomfort.  If you have excessive pain, swelling, redness, drainage or other problems, notify your physician.  If unable to urinate in 6 to 8 hours after surgery or urinating frequently in small amounts, notify your doctor or go to the nearest Emergency Room.  Medications per physician instructions as indicated on Discharge Medication Information  Sheet.      SPECIAL INSTRUCTIONS:

## 2023-04-03 NOTE — OP NOTE
Preoperative diagnosis: Symptomatic cholelithiasis    Postoperative diagnosis: Same    Procedure: Robotic cholecystectomy    Surgeon: Darian    Anesthesia: General    Assistant: Vivian Christensen    EBL: 3    Specimens: Gallbladder with contents    Complications: None    Indications: 57-year-old female with cirrhosis and gallstones.  Began having upper abdominal pain.  Ultrasound showed stones.  She presents today for elective cholecystectomy.    PROCEDURE    Patient was seen in the preoperative holding area.  Risks, benefits, and alternatives of the operation were again discussed in detail.  All of the patient's and family's questions were answered.  They voiced understanding, and agreed to proceed.    Patient was brought to the operating room.  Monitoring devices and Tremaine stockings were placed.  Anesthesia was administered.  Patient was prepped and draped in standard surgical fashion.  After prepping and draping a timeout was performed to verify both the correct patient and correct procedure.    We began by injecting local anesthesia in the left upper quadrant.  An incision to accommodate a 12 mm trocar was made in the skin, and the Veress needle was inserted into the abdomen.  Intra-abdominal placement was verified with a normal saline drop test.  After verification, the abdomen was insufflated to 15 mmHg pressure.  Once the abdomen was insufflated, used the Visiport technique in the left upper quadrant, the abdomen was entered.  Once the abdomen was entered we reinserted the laparoscope and looked underneath our Visiport and Veress needle entry sites, and we saw no obvious underlying injury.  We then placed our subsequent trocars.  After injection of local anesthesia and under direct visualization, a 12 mm trocar was placed in the left upper abdomen.  8 mm trocars were placed in the right abdomen, right mid abdomen and mid abdomen.    We placed the patient in reverse Trendelenburg position and brought in the  robot.  The robot was docked.  We began by grasping the gallbladder and retracting it above the liver.  We took down any adhesions to the gallbladder with a combination of blunt dissection and electrocautery.  The gallbladder was then grasped and retracted out laterally.  We began by making a window between the liver bed and the gallbladder itself.  We skeletonized any structures within this window.  In this window we were able to see 2 structures going directly into the gallbladder, 1 which appear to be the cystic duct and 1 which appear to be the cystic artery.  Through this window we were also able to see the right lobe of the liver, and we saw no crossing structures.  We rotated the gallbladder medially and were able to see through this window medially and laterally with no crossing structures behind.  Thus we felt we obtained a critical view of safety.    Throughout the course of the operation we used the IC-Green onlay.  With the IC-Green we were able to verify our anatomy.  We are able to visualize the gallbladder, the cystic duct, and the right lobe of the liver with the IC-Green.  Additionally, we saw structure medially and inferiorly to our dissection which appeared to be the common bile duct.  The cystic artery did not light up with the IC-Green, as would be expected.  Thus we felt we verified our anatomy after obtaining critical view of safety and identified to the common bile duct.    We then placed 2 clips proximally and 1 clip distally on the cystic duct.  We placed 1 clip proximally and 1 clip distally on the cystic artery.  We ligated these with laparoscopic scissors.  We then elevated the gallbladder out of the liver bed and carefully dissected it free with electrocautery.  The gallbladder was then amputated, placed in Endo Catch bag, and removed from the abdomen.  The specimen was passed off for pathology.    We inspected our operative field and made sure everything appeared to be hemostatic.  We  inspected the common bile duct with the IC-Green, and it appeared to be a single column of bile with no obvious signs of obstruction or injury.  We irrigated the right upper quadrant and suctioned free any bile or blood.  We irrigated until the effluent was clear, and suctioned that free.    We then remove the robotic arms and undocked the robot.    We then removed the 12 mm trocars, and those sites were closed with a [place] device and 0 Vicryl suture.  The remaining trocars were then removed under direct visualization, and the abdomen was desufflated.  Skin incisions were closed with subcuticular 4-0 Vicryl stitch and dressed with skin glue.    The patient was then aroused from anesthesia, and taken off the OR table, and taken to PACU in stable condition.  Sponge, needle, and instrument counts were correct x2.  Vivian Christensen was present for the entire procedure and helped in all portions of the case.    Assistant: Vivian Christensen CSA was responsible for performing the following activities: Suction, Irrigation, Suturing, Closing, Placing Dressing and Held/Positioned Camera and their skilled assistance was necessary for the success of this case.      The operative note was dictated with the help of the Dragon dictation system.

## 2023-04-04 LAB
CYTO UR: NORMAL
LAB AP CASE REPORT: NORMAL
LAB AP CLINICAL INFORMATION: NORMAL
PATH REPORT.FINAL DX SPEC: NORMAL
PATH REPORT.GROSS SPEC: NORMAL
QT INTERVAL: 388 MS

## 2023-04-10 ENCOUNTER — TELEPHONE (OUTPATIENT)
Dept: SURGERY | Facility: CLINIC | Age: 58
End: 2023-04-10
Payer: MEDICARE

## 2023-04-10 NOTE — TELEPHONE ENCOUNTER
"PATIENT CALLED AND STATES SHE HAD A ROBOTIC CHOLECYSTECTOMY DONE ON 4/3/23. SHE STATES THAT SHE IS ITCHING AT HER INCISION SITE AND THAT HER INCISIONS ARE RED LOOKING,ALSO THAT HER STOMACH \"LOOKS LIKE A BEACH BALL\". I EXPLAINED TO THE PATIENT THAT DR STROUD WAS OUT OF THE OFFICE AND SO WAS OUR NURSE PRACTITIONER AND ADVISED THE PATIENT TO GO TO THE ER. PATIENT VOICED UNDERSTANDING.  "

## 2023-04-28 NOTE — PRE-PROCEDURE INSTRUCTIONS
IMPORTANT INSTRUCTIONS - PRE-ADMISSION TESTING  1. DO NOT EAT OR CHEW anything after midnight the night before your procedure.    2. You may have CLEAR liquids up to __2____ hours prior to ARRIVAL time.   3. Take the following medications the morning of your procedure with JUST A SIP OF WATER:  ____ALBUTEROL INHALER IF NEEDED AND BRING WITH YOU, AMITRIPTYLINE, BREZTRI INHALER AND BRING WITH YOU, FLEXERIL IF NEEDED___________________________________________________________________________________________________________________________________________________________________________________    4. DO NOT BRING your medications to the hospital with you, UNLESS something has changed since your PRE-Admission Testing appointment.  5. Hold all vitamins, supplements, and NSAIDS INCLUDING DICLOFENAC(Non- steroidal anti-inflammatory meds) for one week prior to surgery (you MAY take Tylenol or Acetaminophen).  6. If you are diabetic, check your blood sugar the morning of your procedure. If it is less than 70 or if you are feeling symptomatic, call the following number for further instructions: [phone]  7. Use your inhalers/nebulizers as usual, the morning of your procedure. BRING YOUR INHALERS with you.   8. Bring your CPAP or BIPAP to hospital, ONLY IF YOU WILL BE SPENDING THE NIGHT.   9. Make sure you have a ride home and have someone who will stay with you the day of your procedure after you go home.  10. If you have any questions, please call your Pre-Admission Testing Nurse, __MELANIA______________ at [phone]__.   11. Per anesthesia request, do not smoke for 24 hours before your procedure or as instructed by your surgeon  12. .BATHING INSTRUCTIONS GIVEN. NO JEWELRY DAY OF PROCEDURE. NO NAIL POLISH UPPER OR LOWER EXTREMITIES.  13. ENTR[facility] MAIN DOOR  14. NO METFORMIN AFTER 6 PM ON 5/2/23

## 2023-05-03 ENCOUNTER — ANESTHESIA (OUTPATIENT)
Dept: PERIOP | Facility: HOSPITAL | Age: 58
End: 2023-05-03
Payer: MEDICARE

## 2023-05-03 ENCOUNTER — TELEPHONE (OUTPATIENT)
Dept: SURGERY | Facility: CLINIC | Age: 58
End: 2023-05-03
Payer: MEDICARE

## 2023-05-03 ENCOUNTER — ANESTHESIA EVENT (OUTPATIENT)
Dept: PERIOP | Facility: HOSPITAL | Age: 58
End: 2023-05-03
Payer: MEDICARE

## 2023-05-03 ENCOUNTER — HOSPITAL ENCOUNTER (OUTPATIENT)
Facility: HOSPITAL | Age: 58
Setting detail: HOSPITAL OUTPATIENT SURGERY
Discharge: HOME | End: 2023-05-03
Attending: ORTHOPAEDIC SURGERY | Admitting: ORTHOPAEDIC SURGERY
Payer: MEDICARE

## 2023-05-03 VITALS
OXYGEN SATURATION: 93 % | BODY MASS INDEX: 35.09 KG/M2 | HEIGHT: 67 IN | HEART RATE: 68 BPM | SYSTOLIC BLOOD PRESSURE: 111 MMHG | RESPIRATION RATE: 16 BRPM | TEMPERATURE: 97.2 F | DIASTOLIC BLOOD PRESSURE: 68 MMHG | WEIGHT: 223.55 LBS

## 2023-05-03 DIAGNOSIS — G56.03 BILATERAL CARPAL TUNNEL SYNDROME: ICD-10-CM

## 2023-05-03 LAB
GLUCOSE BLDC GLUCOMTR-MCNC: 122 MG/DL (ref 70–99)
GLUCOSE BLDC GLUCOMTR-MCNC: 97 MG/DL (ref 70–99)

## 2023-05-03 PROCEDURE — 25010000002 DEXAMETHASONE PER 1 MG

## 2023-05-03 PROCEDURE — 25010000002 PROPOFOL 10 MG/ML EMULSION

## 2023-05-03 PROCEDURE — 25010000002 MIDAZOLAM PER 1MG: Performed by: ANESTHESIOLOGY

## 2023-05-03 PROCEDURE — 25010000002 FENTANYL CITRATE (PF) 50 MCG/ML SOLUTION

## 2023-05-03 PROCEDURE — 82948 REAGENT STRIP/BLOOD GLUCOSE: CPT

## 2023-05-03 PROCEDURE — 25010000002 ONDANSETRON PER 1 MG

## 2023-05-03 PROCEDURE — 25010000002 CEFAZOLIN IN DEXTROSE 2-4 GM/100ML-% SOLUTION: Performed by: ORTHOPAEDIC SURGERY

## 2023-05-03 PROCEDURE — 64721 CARPAL TUNNEL SURGERY: CPT | Performed by: ORTHOPAEDIC SURGERY

## 2023-05-03 RX ORDER — PROMETHAZINE HYDROCHLORIDE 25 MG/1
25 SUPPOSITORY RECTAL ONCE AS NEEDED
Status: DISCONTINUED | OUTPATIENT
Start: 2023-05-03 | End: 2023-05-03 | Stop reason: HOSPADM

## 2023-05-03 RX ORDER — MAGNESIUM HYDROXIDE 1200 MG/15ML
LIQUID ORAL AS NEEDED
Status: DISCONTINUED | OUTPATIENT
Start: 2023-05-03 | End: 2023-05-03 | Stop reason: HOSPADM

## 2023-05-03 RX ORDER — HYDROCODONE BITARTRATE AND ACETAMINOPHEN 7.5; 325 MG/1; MG/1
1 TABLET ORAL ONCE AS NEEDED
Status: DISCONTINUED | OUTPATIENT
Start: 2023-05-03 | End: 2023-05-03 | Stop reason: HOSPADM

## 2023-05-03 RX ORDER — LIDOCAINE HYDROCHLORIDE 20 MG/ML
INJECTION, SOLUTION EPIDURAL; INFILTRATION; INTRACAUDAL; PERINEURAL AS NEEDED
Status: DISCONTINUED | OUTPATIENT
Start: 2023-05-03 | End: 2023-05-03 | Stop reason: SURG

## 2023-05-03 RX ORDER — DEXAMETHASONE SODIUM PHOSPHATE 4 MG/ML
INJECTION, SOLUTION INTRA-ARTICULAR; INTRALESIONAL; INTRAMUSCULAR; INTRAVENOUS; SOFT TISSUE AS NEEDED
Status: DISCONTINUED | OUTPATIENT
Start: 2023-05-03 | End: 2023-05-03 | Stop reason: SURG

## 2023-05-03 RX ORDER — MEPERIDINE HYDROCHLORIDE 25 MG/ML
12.5 INJECTION INTRAMUSCULAR; INTRAVENOUS; SUBCUTANEOUS
Status: DISCONTINUED | OUTPATIENT
Start: 2023-05-03 | End: 2023-05-03 | Stop reason: HOSPADM

## 2023-05-03 RX ORDER — ACETAMINOPHEN 500 MG
1000 TABLET ORAL ONCE
Status: COMPLETED | OUTPATIENT
Start: 2023-05-03 | End: 2023-05-03

## 2023-05-03 RX ORDER — FENTANYL CITRATE 50 UG/ML
INJECTION, SOLUTION INTRAMUSCULAR; INTRAVENOUS AS NEEDED
Status: DISCONTINUED | OUTPATIENT
Start: 2023-05-03 | End: 2023-05-03 | Stop reason: SURG

## 2023-05-03 RX ORDER — CEFAZOLIN SODIUM 2 G/100ML
2 INJECTION, SOLUTION INTRAVENOUS ONCE
Status: COMPLETED | OUTPATIENT
Start: 2023-05-03 | End: 2023-05-03

## 2023-05-03 RX ORDER — ONDANSETRON 2 MG/ML
4 INJECTION INTRAMUSCULAR; INTRAVENOUS ONCE AS NEEDED
Status: DISCONTINUED | OUTPATIENT
Start: 2023-05-03 | End: 2023-05-03 | Stop reason: HOSPADM

## 2023-05-03 RX ORDER — BUPIVACAINE HYDROCHLORIDE 5 MG/ML
INJECTION, SOLUTION EPIDURAL; INTRACAUDAL AS NEEDED
Status: DISCONTINUED | OUTPATIENT
Start: 2023-05-03 | End: 2023-05-03 | Stop reason: HOSPADM

## 2023-05-03 RX ORDER — CEFAZOLIN SODIUM IN 0.9 % NACL 3 G/100 ML
3 INTRAVENOUS SOLUTION, PIGGYBACK (ML) INTRAVENOUS ONCE
Status: DISCONTINUED | OUTPATIENT
Start: 2023-05-03 | End: 2023-05-03 | Stop reason: SDUPTHER

## 2023-05-03 RX ORDER — PROMETHAZINE HYDROCHLORIDE 12.5 MG/1
25 TABLET ORAL ONCE AS NEEDED
Status: DISCONTINUED | OUTPATIENT
Start: 2023-05-03 | End: 2023-05-03 | Stop reason: HOSPADM

## 2023-05-03 RX ORDER — MIDAZOLAM HYDROCHLORIDE 2 MG/2ML
2 INJECTION, SOLUTION INTRAMUSCULAR; INTRAVENOUS ONCE
Status: COMPLETED | OUTPATIENT
Start: 2023-05-03 | End: 2023-05-03

## 2023-05-03 RX ORDER — PROPOFOL 10 MG/ML
VIAL (ML) INTRAVENOUS AS NEEDED
Status: DISCONTINUED | OUTPATIENT
Start: 2023-05-03 | End: 2023-05-03 | Stop reason: SURG

## 2023-05-03 RX ORDER — OXYCODONE HYDROCHLORIDE 5 MG/1
5 TABLET ORAL
Status: DISCONTINUED | OUTPATIENT
Start: 2023-05-03 | End: 2023-05-03 | Stop reason: HOSPADM

## 2023-05-03 RX ORDER — HYDROCODONE BITARTRATE AND ACETAMINOPHEN 7.5; 325 MG/1; MG/1
1 TABLET ORAL EVERY 4 HOURS PRN
Qty: 21 TABLET | Refills: 0 | Status: SHIPPED | OUTPATIENT
Start: 2023-05-03

## 2023-05-03 RX ORDER — ONDANSETRON 2 MG/ML
INJECTION INTRAMUSCULAR; INTRAVENOUS AS NEEDED
Status: DISCONTINUED | OUTPATIENT
Start: 2023-05-03 | End: 2023-05-03 | Stop reason: SURG

## 2023-05-03 RX ORDER — SODIUM CHLORIDE, SODIUM LACTATE, POTASSIUM CHLORIDE, CALCIUM CHLORIDE 600; 310; 30; 20 MG/100ML; MG/100ML; MG/100ML; MG/100ML
9 INJECTION, SOLUTION INTRAVENOUS CONTINUOUS PRN
Status: DISCONTINUED | OUTPATIENT
Start: 2023-05-03 | End: 2023-05-03 | Stop reason: HOSPADM

## 2023-05-03 RX ADMIN — CEFAZOLIN SODIUM 2 G: 2 INJECTION, SOLUTION INTRAVENOUS at 07:44

## 2023-05-03 RX ADMIN — ONDANSETRON 4 MG: 2 INJECTION INTRAMUSCULAR; INTRAVENOUS at 07:45

## 2023-05-03 RX ADMIN — DEXAMETHASONE SODIUM PHOSPHATE 4 MG: 4 INJECTION, SOLUTION INTRA-ARTICULAR; INTRALESIONAL; INTRAMUSCULAR; INTRAVENOUS; SOFT TISSUE at 07:45

## 2023-05-03 RX ADMIN — LIDOCAINE HYDROCHLORIDE 50 MG: 20 INJECTION, SOLUTION EPIDURAL; INFILTRATION; INTRACAUDAL; PERINEURAL at 07:36

## 2023-05-03 RX ADMIN — FENTANYL CITRATE 50 MCG: 50 INJECTION, SOLUTION INTRAMUSCULAR; INTRAVENOUS at 07:49

## 2023-05-03 RX ADMIN — MIDAZOLAM HYDROCHLORIDE 2 MG: 1 INJECTION, SOLUTION INTRAMUSCULAR; INTRAVENOUS at 07:19

## 2023-05-03 RX ADMIN — SODIUM CHLORIDE, POTASSIUM CHLORIDE, SODIUM LACTATE AND CALCIUM CHLORIDE 9 ML/HR: 600; 310; 30; 20 INJECTION, SOLUTION INTRAVENOUS at 06:52

## 2023-05-03 RX ADMIN — FENTANYL CITRATE 50 MCG: 50 INJECTION, SOLUTION INTRAMUSCULAR; INTRAVENOUS at 07:36

## 2023-05-03 RX ADMIN — PROPOFOL 200 MG: 10 INJECTION, EMULSION INTRAVENOUS at 07:36

## 2023-05-03 RX ADMIN — ACETAMINOPHEN 1000 MG: 500 TABLET ORAL at 06:52

## 2023-05-03 NOTE — DISCHARGE INSTRUCTIONS
DISCHARGE INSTRUCTIONS  CARPAL TUNNEL RELEASE      For your surgery you had:  General anesthesia (you may have a sore throat for the first 24 hours)  You may experience dizziness, drowsiness, or lightheadedness for several hours following surgery.  Do not stay alone today or tonight.  Limit your activity for 24 hours.  Resume your diet slowly.    You should not drive or operate machinery, drink alcohol, or sign legally binding documents for 24 hours or while you are taking pain medication.  . Carry the upper arm  so that the hand and wrist are above the level of the heart. Elevate affected arm on a pillow when resting.   Use ice to affected area for 48-72 hours. Apply 20 minutes on - 20 minutes off. Do NOT apply directly to skin.  Exercise fingers frequently by making a full fist and fully straightening the fingers. This will help prevent swelling and stiffness.  Do NOT do any heavy lifting, pulling or strenuous activities using the affected hand. [] Keep splint clean and dry.  []  Do NOT submerge in water.  [] You may shower or bathe in  Wrap dressing with plastic wrap or bag when bathing    .  NOTIFY YOUR DOCTOR IF YOU EXPERIENCE ANY OF THE FOLLOWING:  Temperature greater than 101 degrees Fahrenheit  Shaking Chills  Redness or excessive drainage from incision  Nausea, vomiting and/or pain that is not controlled by prescribed medications  Increase in bleeding or bleeding that is excessive  Unable to urinate in 6 hours after surgery  If unable to reach your doctor, please go to the closest Emergency Room  Last dose of pain medication was given at:      SPECIAL INSTRUCTIONS:  May take an over the counter stool softener as needed             I have read and received the above instructions.

## 2023-05-03 NOTE — OP NOTE
CARPAL TUNNEL RELEASE  Procedure Report    Patient Name:  Ester Pinon  YOB: 1965    Date of Surgery:  5/3/2023     Indications:  +CTS    Pre-op Diagnosis:   Bilateral carpal tunnel syndrome [G56.03]       Post-Op Diagnosis Codes:     * Bilateral carpal tunnel syndrome [G56.03]    Procedure/CPT® Codes:      Procedure(s):  LEFT CARPAL TUNNEL RELEASE    Staff:  Surgeon(s):  Libra Guaman MD    Assistant: Isha Charlton    Anesthesia: General    Estimated Blood Loss: 2 mL    Implants:    Nothing was implanted during the procedure    Specimen:          None        Findings: Carpal Tunnel Syndrome     Complications: None    Description of Procedure: The patient was brought to the operating room and underwent anesthesia without complication.  The patient received preoperative antibiotic and was then prepped and draped in sterile fashion.  Incision was made directly over the transverse carpal ligament and dissected down.  The ligament was identified and then opened using the #15 blade and then completed using tenotomy scissors.  Care was taken to avoid any neurovascular or tendinous structures and then checked with a Keystone and a good release was achieved.  This was then thoroughly irrigated and closed using 4-0 nylon.  Half percent plain Marcaine was injected around the surgical site.  A sterile dressing was applied, followed by application of a splint.  The tourniquet was then deflated.  The patient was taken to the recovery room in stable condition.  There were no complications.    Assistant: Isha Charlton  was responsible for performing the following activities: Retraction, Suction, Irrigation, Closing and Placing Dressing and their skilled assistance was necessary for the success of this case.    Libra Guaman MD     Date: 5/3/2023  Time: 08:03 EDT

## 2023-05-03 NOTE — H&P
"Chief Complaint  No chief complaint on file.     Jimmy Pinon presents to [facility] OR for initial evaluation of bilateral hands. She has numbness and tingling in bilateral hands.  Her left one is worse then the right.  She has had this for years and notes she has braced and gives some relief.  She notes dropping items and difficulty with [facility].  She is here today to discuss left carpal tunnel surgery.     No Known Allergies     Social History     Socioeconomic History   • Marital status:    Tobacco Use   • Smoking status: Former     Packs/day: 1.50     Years: 30.00     Pack years: 45.00     Types: Cigarettes     Start date: 1/1/1984     Quit date: 4/28/2020     Years since quitting: 3.0   • Smokeless tobacco: Never   • Tobacco comments:     smoked on and off quit when I got pregnant than started back up   Vaping Use   • Vaping Use: Never used   Substance and Sexual Activity   • Alcohol use: Not Currently     Comment: Had to stop cause of liver   • Drug use: Not Currently     Types: Marijuana   • Sexual activity: Defer        Review of Systems     Objective   Vital Signs:   /77 (BP Location: Left arm)   Pulse 82   Temp 97.7 °F (36.5 °C) (Temporal)   Resp 20   Ht 170.2 cm (67\")   Wt 101 kg (223 lb 8.7 oz)   SpO2 94%   BMI 35.01 kg/m²       Physical Exam  Constitutional:       Appearance: Normal appearance. Patient is well-developed and normal weight.   HENT:      Head: Normocephalic.      Right Ear: Hearing and external ear normal.      Left Ear: Hearing and external ear normal.      Nose: Nose normal.   Eyes:      Conjunctiva/sclera: Conjunctivae normal.   Cardiovascular:      Rate and Rhythm: Normal rate.   Pulmonary:      Effort: Pulmonary effort is normal.      Breath sounds: No wheezing or rales.   Abdominal:      Palpations: Abdomen is soft.      Tenderness: There is no abdominal tenderness.   Musculoskeletal:      Cervical back: Normal range of motion. "   Skin:     Findings: No rash.   Neurological:      Mental Status: Patient is alert and oriented to person, place, and time.   Psychiatric:         Mood and Affect: Mood and affect normal.         Judgment: Judgment normal.       Ortho Exam      BILATERAL HANDS Negative Compression testing/ Positive Tinels. NegativeFinkelsteins. Negative Salazar's testing. Negative CMC grind testing. Positive Phalens. Full ROM of the hand, fingers, elbow and wrist. Negative Triggering of the digit. Sensation grossly intact to light touch, median, radial and ulnar nerve. Positive AIN, PIN and ulnar nerve motor function intact. Axillary nerve intact. Positive pulses.         Procedures      Imaging Results (Most Recent)     None           Result Review :     3/20/23 EMG   Nerve conduction study is abnormal and shows electrophysiologic evidence for moderate to severe left carpal tunnel syndrome and moderate right carpal tunnel syndrome     Assessment and Plan     Diagnoses and all orders for this visit:    1. Bilateral carpal tunnel syndrome  -     ceFAZolin in dextrose (ANCEF) IVPB solution 2 g  -     Discontinue: ceFAZolin in Sodium Chloride (ANCEF) IVPB solution 3 g    Other orders  -     Follow Anesthesia Guidelines / Protocol; Standing  -     POC Glucose STAT; Standing  -     Notify Anesthesia if Blood Sugar Greater Than 180; Standing  -     Follow Anesthesia Guidelines / Protocol; Standing  -     Nerve Block; Standing  -     Verify NPO Status; Standing  -     SCD (Sequential Compression Device) Place on Patient in Pre-Op; Standing  -     POC Glucose Once; Standing  -     Clip Operative Site; Standing  -     Obtain Informed Consent (If Not Done Inpatient or PAT); Standing  -     Instructions on coughing, deep breathing, and incentive spirometry.; Standing  -     Follow Anesthesia Guidelines / Protocol  -     Nerve Block  -     Verify NPO Status  -     SCD (Sequential Compression Device) Place on Patient in Pre-Op  -     POC Glucose  Once  -     Clip Operative Site  -     Obtain Informed Consent (If Not Done Inpatient or PAT)  -     Instructions on coughing, deep breathing, and incentive spirometry.  -     Instructions on coughing, deep breathing, and incentive spirometry.  -     Instructions on coughing, deep breathing, and incentive spirometry.  -     Instructions on coughing, deep breathing, and incentive spirometry.  -     Instructions on coughing, deep breathing, and incentive spirometry.  -     Follow Anesthesia Guidelines / Protocol  -     POC Glucose STAT  -     Notify Anesthesia if Blood Sugar Greater Than 180        Discussed the treatment plan with the patient. I reviewed the EMG with the patients. Discussed the treatment options with the patient, operative vs non-operative. The patient expressed understanding and wished to proceed wit a left carpal tunnel surgery.     Educated on risk of smoking. Discussed options for smoking cessation., Discussed surgery., Risks/benefits discussed with patient including, but not limited to: infection, bleeding, neurovascular damage, malunion, nonunion, aesthetic deformity, need for further surgery, and death., Surgery pamphlet given. and Call or return if worsening symptoms.    Follow Up     2 weeks postoperatively         I have personally performed the services described in this document as scribed by the above individual and it is both accurate and complete. Libra Guaman MD 05/03/23

## 2023-05-03 NOTE — TELEPHONE ENCOUNTER
Pt had cholecystectomy on 04/03. She missed her f/u and forget to r/s it. She is calling now because she says that one of her incisions does not look right. She reports that it has a funny color to it. Denies fever, drainage or warmth of the area. Wants it looked at.

## 2023-05-03 NOTE — ANESTHESIA PREPROCEDURE EVALUATION
Anesthesia Evaluation     Patient summary reviewed and Nursing notes reviewed                Airway   Mallampati: I  TM distance: >3 FB  Neck ROM: full  No difficulty expected and Large neck circumference  Dental    (+) lower dentures and upper dentures    Pulmonary - negative pulmonary ROS and normal exam    breath sounds clear to auscultation  Cardiovascular - normal exam    Rhythm: regular  Rate: normal    (+) hyperlipidemia,       Neuro/Psych  (+) numbness,    GI/Hepatic/Renal/Endo    (+) obesity,   liver disease, diabetes mellitus,     Musculoskeletal     (+) myalgias,   Abdominal    Substance History - negative use     OB/GYN negative ob/gyn ROS         Other                      Anesthesia Plan    ASA 3     general     intravenous induction     Anesthetic plan, risks, benefits, and alternatives have been provided, discussed and informed consent has been obtained with: patient.        CODE STATUS:

## 2023-05-03 NOTE — ANESTHESIA POSTPROCEDURE EVALUATION
Patient: Ester PACKER Zi    Procedure Summary     Date: 05/03/23 Room / Location: [facility] OR  / [facility]    Anesthesia Start: 0734 Anesthesia Stop: 0805    Procedure: LEFT CARPAL TUNNEL RELEASE (Left: Wrist) Diagnosis:       Bilateral carpal tunnel syndrome      (Bilateral carpal tunnel syndrome [G56.03])    Surgeons: Libra Guaman MD Provider: Rafael Hwang CRNA    Anesthesia Type: general ASA Status: 3          Anesthesia Type: general    Vitals  Vitals Value Taken Time   /71 05/03/23 0835   Temp 36.2 °C (97.1 °F) 05/03/23 0820   Pulse 82 05/03/23 0835   Resp 16 05/03/23 0835   SpO2 92 % 05/03/23 0835           Post Anesthesia Care and Evaluation    Patient location during evaluation: bedside  Patient participation: complete - patient participated  Level of consciousness: awake    Airway patency: patent  PONV Status: none  Cardiovascular status: acceptable  Respiratory status: acceptable  Hydration status: acceptable

## 2023-05-04 NOTE — TELEPHONE ENCOUNTER
Attempted to call the patient to offer an appt today with Livier Rodríguez at 1 or 1:30. No answer, LMOM for pt to call the office back.

## 2023-05-12 ENCOUNTER — TELEPHONE (OUTPATIENT)
Dept: SURGERY | Facility: CLINIC | Age: 58
End: 2023-05-12
Payer: MEDICARE

## 2023-05-18 ENCOUNTER — OFFICE VISIT (OUTPATIENT)
Dept: ORTHOPEDIC SURGERY | Facility: CLINIC | Age: 58
End: 2023-05-18
Payer: MEDICARE

## 2023-05-18 VITALS — BODY MASS INDEX: 34.95 KG/M2 | WEIGHT: 222.66 LBS | HEART RATE: 90 BPM | HEIGHT: 67 IN | OXYGEN SATURATION: 95 %

## 2023-05-18 DIAGNOSIS — Z98.890 S/P CARPAL TUNNEL RELEASE: Primary | ICD-10-CM

## 2023-05-18 DIAGNOSIS — G56.03 BILATERAL CARPAL TUNNEL SYNDROME: ICD-10-CM

## 2023-05-18 RX ORDER — AMOXICILLIN AND CLAVULANATE POTASSIUM 875; 125 MG/1; MG/1
TABLET, FILM COATED ORAL
COMMUNITY
Start: 2023-05-11

## 2023-05-18 RX ORDER — GABAPENTIN 300 MG/1
CAPSULE ORAL
COMMUNITY
Start: 2023-05-11

## 2023-05-18 RX ORDER — HYDROCODONE BITARTRATE AND ACETAMINOPHEN 7.5; 325 MG/1; MG/1
1 TABLET ORAL EVERY 4 HOURS PRN
Qty: 42 TABLET | Refills: 0 | Status: SHIPPED | OUTPATIENT
Start: 2023-05-18

## 2023-05-18 NOTE — PROGRESS NOTES
"Chief Complaint  Pain and Follow-up of the Left Wrist and Suture / Staple Removal    Subjective      Estersa BIRDIE Pinon presents to [facility] for 2-week follow-up of the left carpal tunnel release by Dr. Guaman on 5/3/2023.  She is doing very well and managing her pain with the pain medications.  She does need a refill today.  Reports that her splint left a blister on her thumb and she is feeling a little bit stiff otherwise doing well.  All of her carpal tunnel symptoms have resolved.  Objective   No Known Allergies    Vital Signs:   Pulse 90   Ht 170.2 cm (67\")   Wt 101 kg (222 lb 10.6 oz)   SpO2 95%   BMI 34.87 kg/m²       Physical Exam    Constitutional: Awake, alert. Well nourished appearance.    Integumentary: Warm, dry, intact. No obvious rashes.    HENT: Atraumatic, normocephalic.   Respiratory: Non labored respirations .   Cardiovascular: Intact peripheral pulses.    Psychiatric: Normal mood and affect. A&O X3    Ortho Exam  Left hand: Incision is healing appropriately with no redness, drainage or tenderness to the touch.  She is able to make a tight fist.  Thumb to finger opposition is intact.  Capillary refill time is brisk.  She has full sensation to her whole hand.  Wrist has full range of motion to flexion, extension, supination and pronation.  Radial pulses intact.    Imaging Results (Most Recent)     None                    Assessment and Plan   Problem List Items Addressed This Visit    None  Visit Diagnoses     S/P carpal tunnel release- left    -  Primary          Follow Up   Return if symptoms worsen or fail to improve.    Patient is a non-smoker, did not discuss options for smoking cessation.     Social History     Socioeconomic History   • Marital status:    Tobacco Use   • Smoking status: Former     Packs/day: 1.50     Years: 30.00     Pack years: 45.00     Types: Cigarettes     Start date: 1/1/1984     Quit date: 4/28/2020     Years since quitting: 3.0 "   • Smokeless tobacco: Never   • Tobacco comments:     smoked on and off quit when I got pregnant than started back up   Vaping Use   • Vaping Use: Never used   Substance and Sexual Activity   • Alcohol use: Not Currently     Comment: Had to stop cause of liver   • Drug use: Not Currently     Types: Marijuana     Comment: marijuana 5-2-23 am   • Sexual activity: Defer       Patient Instructions   Sutures removed in office.  Patient educated on incision care.  Please keep incision clean and dry.  Do not soak or submerge in water until incision is fully healed.  Do not apply creams or lotions over the incision. Continue icing and elevation as needed to help with pain and swelling.  Ice up to 3 or 4 times daily for no longer than 15 to 20 minutes at a time.    Patient advised on return to carpal tunnel brace(s) nightly and with activity.  Patient has carpal tunnel braces at home.  Avoid repetitive ROM of the hand or wrist.  No heavy lifting, pushing, or pulling until incision is fully healed.  Home exercises provided today.     Follow-up as needed for worsening symptoms.  Please call with questions or concerns.    Patient was given instructions and counseling regarding her condition or for health maintenance advice. Please see specific information pulled into the AVS if appropriate.

## 2023-05-18 NOTE — PATIENT INSTRUCTIONS
Sutures removed in office.  Patient educated on incision care.  Please keep incision clean and dry.  Do not soak or submerge in water until incision is fully healed.  Do not apply creams or lotions over the incision. Continue icing and elevation as needed to help with pain and swelling.  Ice up to 3 or 4 times daily for no longer than 15 to 20 minutes at a time.    Patient advised on return to carpal tunnel brace(s) nightly and with activity.  Patient has carpal tunnel braces at home.  Avoid repetitive ROM of the hand or wrist.  No heavy lifting, pushing, or pulling until incision is fully healed.  Home exercises provided today.     Follow-up as needed for worsening symptoms.  Please call with questions or concerns.

## 2024-01-29 ENCOUNTER — OFFICE VISIT (OUTPATIENT)
Dept: NEUROLOGY | Facility: CLINIC | Age: 59
End: 2024-01-29
Payer: MEDICARE

## 2024-01-29 ENCOUNTER — PATIENT ROUNDING (BHMG ONLY) (OUTPATIENT)
Dept: NEUROLOGY | Facility: CLINIC | Age: 59
End: 2024-01-29
Payer: MEDICARE

## 2024-01-29 VITALS
SYSTOLIC BLOOD PRESSURE: 139 MMHG | HEIGHT: 67 IN | HEART RATE: 83 BPM | BODY MASS INDEX: 39.24 KG/M2 | DIASTOLIC BLOOD PRESSURE: 93 MMHG | WEIGHT: 250 LBS

## 2024-01-29 DIAGNOSIS — G25.81 RESTLESS LEGS SYNDROME (RLS): Primary | ICD-10-CM

## 2024-01-29 PROCEDURE — 1159F MED LIST DOCD IN RCRD: CPT | Performed by: PSYCHIATRY & NEUROLOGY

## 2024-01-29 PROCEDURE — 99202 OFFICE O/P NEW SF 15 MIN: CPT | Performed by: PSYCHIATRY & NEUROLOGY

## 2024-01-29 PROCEDURE — 1160F RVW MEDS BY RX/DR IN RCRD: CPT | Performed by: PSYCHIATRY & NEUROLOGY

## 2024-01-29 NOTE — PROGRESS NOTES
"Chief Complaint  Neurologic Problem (Restless leg syndrome. )    Subjective          Ester Pinon is a 58 y.o. female who presents to [facility]  History of Present Illness  58-year-old woman evaluated for restless leg syndrome.  She states that she is taking gabapentin 300 mg 3 times a day.  She states that her legs are worse in the evenings and at night and she has the urge to move it and then she even gets up and walks around.  She states that she has the symptoms in the daytime as well.  Is taking ropinirole 2 mg nightly.  She has a history of diabetes.    Objective   Vital Signs:   /93   Pulse 83   Ht 170.2 cm (67.01\")   Wt 113 kg (250 lb)   BMI 39.15 kg/m²     Physical Exam   Alert, fluent, phasic, follows commands well.  There is no weakness of the lower extremity individual testing.  Reflexes are absent.        Assessment and Plan  Diagnoses and all orders for this visit:    1. Restless legs syndrome (RLS) (Primary)  Assessment & Plan:  I discussed with her that I would like for her to take gabapentin 300 mg 2 tabs nightly and 1 tablet daily for 1 week.  If there is no significant improvement of her symptoms she is to take 900 mg of gabapentin nightly and see if that helps her symptoms.  I will see her again in 6 weeks time for follow-up and if there is no improvement of her symptoms I will change her ropinirole to Mirapex.  Thank you for letting me participate in her care.           Total time spent with the patient and coordinating patient care was 20 minutes.    Follow Up  No follow-ups on file.  Patient was given instructions and counseling regarding her condition or for health maintenance advice. Please see specific information pulled into the AVS if appropriate.       " Patient calling she is still having shoulder pain, she stop the antiinflammatory medication because it was not helping, she thinks the pain is coming from her back. She just got a new mattress and that has not helped at all. 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 Is there a test that you can run to check for Sirva?     Please call, [phone]

## 2024-01-30 NOTE — ASSESSMENT & PLAN NOTE
I discussed with her that I would like for her to take gabapentin 300 mg 2 tabs nightly and 1 tablet daily for 1 week.  If there is no significant improvement of her symptoms she is to take 900 mg of gabapentin nightly and see if that helps her symptoms.  I will see her again in 6 weeks time for follow-up and if there is no improvement of her symptoms I will change her ropinirole to Mirapex.  Thank you for letting me participate in her care.

## 2024-03-12 ENCOUNTER — PROCEDURE VISIT (OUTPATIENT)
Dept: NEUROLOGY | Facility: CLINIC | Age: 59
End: 2024-03-12
Payer: MEDICARE

## 2024-03-12 VITALS
SYSTOLIC BLOOD PRESSURE: 123 MMHG | HEIGHT: 67 IN | HEART RATE: 61 BPM | DIASTOLIC BLOOD PRESSURE: 66 MMHG | WEIGHT: 250 LBS | BODY MASS INDEX: 39.24 KG/M2

## 2024-03-12 DIAGNOSIS — G25.81 RESTLESS LEGS SYNDROME (RLS): Primary | ICD-10-CM

## 2024-03-12 DIAGNOSIS — G47.61 PERIODIC LIMB MOVEMENTS OF SLEEP: ICD-10-CM

## 2024-03-12 NOTE — ASSESSMENT & PLAN NOTE
Patient has probable parotic limb movements of sleep as well as sleep apnea and nocturnal cramps.  I will refer to sleep medicine for further management and treatment.

## 2024-03-12 NOTE — ASSESSMENT & PLAN NOTE
Nerve conduction study is normal and does not show electrophysiologic evidence for neuropathy.  Her restless leg syndrome is improved.  She does not have the sensation to move her legs when she is lying down however she is complaining of nocturnal cramps.

## 2024-03-12 NOTE — PROGRESS NOTES
"Chief Complaint  Nerve Study and Numbness (Numbness & tingling in BLE, L>R)    Subjective          Ester Pinon is a 58 y.o. female who presents to [facility]  History of Present Illness  58-year-old woman here for follow-up for nerve conduction study.  She has nocturnal cramps.  She has it on a nightly basis.  She states that her restless leg syndrome is improved.  She also has problems with feeling not well rested when she wakes up in the morning.  She feels tired and sleepy during the daytime.  Objective   Vital Signs:   /66   Pulse 61   Ht 170.2 cm (67.01\")   Wt 113 kg (250 lb)   BMI 39.15 kg/m²     Physical Exam   Alert, fluent, phasic, follows commands well.        Assessment and Plan  Diagnoses and all orders for this visit:    1. Restless legs syndrome (RLS) (Primary)  Assessment & Plan:  Nerve conduction study is normal and does not show electrophysiologic evidence for neuropathy.  Her restless leg syndrome is improved.  She does not have the sensation to move her legs when she is lying down however she is complaining of nocturnal cramps.    Orders:  -     Ambulatory Referral to Sleep Medicine    2. Periodic limb movements of sleep  Assessment & Plan:  Patient has probable parotic limb movements of sleep as well as sleep apnea and nocturnal cramps.  I will refer to sleep medicine for further management and treatment.    Orders:  -     Ambulatory Referral to Sleep Medicine         Nerve Conduction Study:  6 nerves     EMG:  Limited    Total time spent with the patient and coordinating patient care was 25 minutes.    Follow Up  No follow-ups on file.  Patient was given instructions and counseling regarding her condition or for health maintenance advice. Please see specific information pulled into the AVS if appropriate.       "

## 2024-04-23 ENCOUNTER — OFFICE VISIT (OUTPATIENT)
Dept: SLEEP MEDICINE | Facility: HOSPITAL | Age: 59
End: 2024-04-23
Payer: MEDICARE

## 2024-04-23 VITALS — HEART RATE: 76 BPM | BODY MASS INDEX: 39.03 KG/M2 | WEIGHT: 248.7 LBS | OXYGEN SATURATION: 92 % | HEIGHT: 67 IN

## 2024-04-23 DIAGNOSIS — E66.9 OBESITY (BMI 30-39.9): ICD-10-CM

## 2024-04-23 DIAGNOSIS — G47.10 HYPERSOMNIA: ICD-10-CM

## 2024-04-23 DIAGNOSIS — R06.83 SNORING: ICD-10-CM

## 2024-04-23 DIAGNOSIS — G47.30 SLEEP APNEA, UNSPECIFIED TYPE: Primary | ICD-10-CM

## 2024-04-23 DIAGNOSIS — G47.8 NON-RESTORATIVE SLEEP: ICD-10-CM

## 2024-04-23 DIAGNOSIS — G25.81 RLS (RESTLESS LEGS SYNDROME): ICD-10-CM

## 2024-04-23 PROCEDURE — 99204 OFFICE O/P NEW MOD 45 MIN: CPT | Performed by: FAMILY MEDICINE

## 2024-04-23 PROCEDURE — G0463 HOSPITAL OUTPT CLINIC VISIT: HCPCS

## 2024-04-23 PROCEDURE — 1160F RVW MEDS BY RX/DR IN RCRD: CPT | Performed by: FAMILY MEDICINE

## 2024-04-23 PROCEDURE — 1159F MED LIST DOCD IN RCRD: CPT | Performed by: FAMILY MEDICINE

## 2024-04-23 RX ORDER — BACLOFEN 20 MG/1
20 TABLET ORAL 3 TIMES DAILY
COMMUNITY
Start: 2024-04-16 | End: 2024-07-16

## 2024-04-23 RX ORDER — OMEGA-3-ACID ETHYL ESTERS 1 G/1
2 CAPSULE, LIQUID FILLED ORAL
COMMUNITY
Start: 2024-01-25 | End: 2025-01-25

## 2024-04-23 RX ORDER — IPRATROPIUM BROMIDE AND ALBUTEROL SULFATE 2.5; .5 MG/3ML; MG/3ML
SOLUTION RESPIRATORY (INHALATION)
COMMUNITY

## 2024-04-23 RX ORDER — FLUOXETINE HYDROCHLORIDE 20 MG/1
CAPSULE ORAL
COMMUNITY

## 2024-04-23 RX ORDER — PREDNISONE 10 MG/1
TABLET ORAL
COMMUNITY
Start: 2024-03-19

## 2024-04-23 RX ORDER — PRAZOSIN HYDROCHLORIDE 2 MG/1
2 CAPSULE ORAL
COMMUNITY
Start: 2024-03-19 | End: 2025-03-20

## 2024-04-23 NOTE — PROGRESS NOTES
[facility] New Patient[facility]Consultation       Reason for Consultation: Restless leg syndrome      Patient Care Team:  Daniel Gonzalez MD as PCP - General (Family Medicine)  Praneeth Jackson MD (Internal Medicine)  Leopoldo Farmer MD as Consulting Physician (General Surgery)  Isabella Vivar APRN as Nurse Practitioner (Nurse Practitioner)  Smita Holland MD as Consulting Physician (Sleep Medicine)      History of present illness:  Thank you for asking me to see your patient.  The patient is a 58 y.o. female presents to concern for sleep disorder; referred by neurology for concern for restless leg syndrome.  Sleep latency varies sleeps 2 to 4 hours 0-1 naps no rotating shifts.  Reports hypersomnia nonrestorative sleep weight gain over the past 5 years waking up yes and for breath waking up coughing/choking waking with dry mouth leg jerking at night urge sensations pain disrupting sleep sweating during sleep heartburn during sleep nocturia frequent nightmares.  BMI 38.9.  Current medications include gabapentin 300 mg 3 times daily, hydroxyzine 50 mg 3 times daily as needed for anxiety, prazosin 2 mg at night, ropinirole 2 mg tablet advised to take 1.5 tablet nightly.  Reports not currently taking gabapentin because she is due for urine drug screen which she has not been able to schedule yet with physician prescribing the medication.  Per neurology records patient presented in March 2020 for for follow-up for nerve conduction study; per neurology records patient reported nocturnal cramps on a nightly basis.  Per March note restless leg syndrome had improved with her current medications.  Nerve conduction study was found to be normal and restless leg syndrome was noted to have improved.  However then patient was then referred by neurology to sleep medicine for restless leg syndrome.    Ferritin in January 2024 was low at 21.    Medical Conditions (PMH):   Anxiety  Depression  Type 2 diabetes  mellitus  Arthritis  Restless legs    Social history:  Do you drive a commercial vehicle:  No   Shift work:  No   Tobacco use:  No   Alcohol use: 0 per week  Caffeinated drinks: Several per day  Occupation: Retired    Family History (parents and siblings) (pertaining to sleep medicine):  Negative    Allergies:  Patient has no known allergies.       Current Outpatient Medications:     albuterol sulfate  (90 Base) MCG/ACT inhaler, albuterol sulfate HFA 90 mcg/actuation aerosol inhaler  INHALE ONE PUFF BY MOUTH INTO LUNGS FOUR TIMES A DAY AS NEEDED FOR SHORTNESS OF AIR, Disp: , Rfl:     atorvastatin (LIPITOR) 20 MG tablet, TAKE 1 TABLET (20 MG TOTAL) BY MOUTH EVERY EVENING., Disp: , Rfl:     baclofen (LIORESAL) 20 MG tablet, Take 1 tablet by mouth 3 (Three) Times a Day., Disp: , Rfl:     Breztri Aerosphere 160-9-4.8 MCG/ACT aerosol inhaler, INHALE 2 SPRAYS INTO THE LUNGS 2 (TWO) TIMES DAILY., Disp: , Rfl:     clobetasol (TEMOVATE) 0.05 % ointment, APPLY TOPICALLY TO AFFECTED AREA(S) ON THE BODY TWICE A DAY UNTIL CLEAR., Disp: , Rfl:     cyclobenzaprine (FLEXERIL) 10 MG tablet, cyclobenzaprine 10 mg tablet  TAKE ONE TABLET BY MOUTH THREE TIMES A DAY ...MAY ADD SECOND TAB TO ONE DOSE DAILY AS NEEDED, Disp: , Rfl:     diclofenac (VOLTAREN) 75 MG EC tablet, TAKE 1 TABLET (75 MG TOTAL) BY MOUTH 2 (TWO) TIMES DAILY AS NEEDED (PAIN)., Disp: , Rfl:     Diclofenac Sodium (VOLTAREN) 1 % gel gel, Apply 2 g topically to the appropriate area as directed., Disp: , Rfl:     DULoxetine (CYMBALTA) 20 MG capsule, Take 1 capsule by mouth 2 (Two) Times a Day., Disp: , Rfl:     ergocalciferol (ERGOCALCIFEROL) 1.25 MG (31301 UT) capsule, Take 1 capsule by mouth 1 (One) Time Per Week. REPORTS HAS NOT STARTED AS OF YET, Disp: , Rfl:     FLUoxetine (PROzac) 20 MG capsule, TAKE ONE CAPSULE BY MOUTH FOR TWO WEEKS THEN INCREASE TO TWO CAPSULES EVERY MORNING THEREAFTER, Disp: , Rfl:     gabapentin (NEURONTIN) 300 MG capsule, TAKE 1 CAPSULE  "(300 MG TOTAL) BY MOUTH 3 (THREE) TIMES DAILY., Disp: , Rfl:     hydrOXYzine (ATARAX) 50 MG tablet, Take 1 tablet by mouth 3 (Three) Times a Day As Needed. for anxiety, Disp: , Rfl:     ipratropium-albuterol (DUO-NEB) 0.5-2.5 mg/3 ml nebulizer, TAKE 3 MLS BY NEBULIZATION EVERY 6 (SIX) HOURS AS NEEDED FOR WHEEZING., Disp: , Rfl:     metFORMIN (GLUCOPHAGE) 500 MG tablet, Take 1 tablet by mouth 2 (Two) Times a Day With Meals. INSTRUCTED PER ANESTHESIA PROTOCOL, Disp: , Rfl:     nystatin (MYCOSTATIN) 144148 UNIT/GM cream, APPLY TOPICALLY TO AFFECTED AREA(S) TWICE A DAY, Disp: , Rfl:     omega-3 acid ethyl esters (Lovaza) 1 g capsule, Take 2 capsules by mouth., Disp: , Rfl:     omeprazole (priLOSEC) 20 MG capsule, omeprazole 20 mg capsule,delayed release  TAKE 1 CAPSULE (20 MG TOTAL) BY MOUTH DAILY., Disp: , Rfl:     ondansetron (Zofran) 4 MG tablet, Take 1 tablet by mouth Every 8 (Eight) Hours As Needed for Nausea or Vomiting., Disp: 10 tablet, Rfl: 0    prazosin (MINIPRESS) 2 MG capsule, Take 1 capsule by mouth., Disp: , Rfl:     predniSONE (DELTASONE) 10 MG tablet, Take 2 tablets by mouth daily for 5 days then 1 tablet by mouth for additional 5, Disp: , Rfl:     rOPINIRole (REQUIP) 2 MG tablet, Every Night. 1.5 tablet nightly, Disp: , Rfl:     vitamin D3 (vitamin d) 125 MCG (5000 UT) capsule capsule, , Disp: , Rfl:     Vital Signs:    Vitals:    04/23/24 0900   Pulse: 76   SpO2: 92%   Weight: 113 kg (248 lb 11.2 oz)   Height: 170.2 cm (67.01\")      Body mass index is 38.94 kg/m².  Neck Circumference: 15 inches      REVIEW OF SYSTEMS:  Pertinent positive symptoms are:  Snoring  Witnessed apnea  [place] Sleepiness Scale of Total score: 19   Fatigue  Nasal congestion  Anxiety  Depression  Frequent heartburn      Physical exam:  Vitals:    04/23/24 0900   Pulse: 76   SpO2: 92%   Weight: 113 kg (248 lb 11.2 oz)   Height: 170.2 cm (67.01\")    Body mass index is 38.94 kg/m². Neck Circumference: 15 inches  HEENT: Head is " atraumatic, normocephalic  Eyes: pupils are round equal and reacting to light and accommodation, conjunctiva normal  Throat: tongue normal  NECK:Neck Circumference: 15 inches  RESPIRATORY SYSTEM: Regular respirations  CARDIOVASULAR SYSTEM: Regular rate  EXTREMITES: No cyanosis, clubbing  NEUROLOGICAL SYSTEM: Oriented x 3, no gross motor defects, gait normal      Impression:  1. Sleep apnea, unspecified type    2. Hypersomnia    3. Non-restorative sleep    4. Obesity (BMI 30-39.9)    5. Snoring    6. RLS (restless legs syndrome)        Plan:    Office note(s) from care team reviewed. Office note(s) reviewed: 3/12/2024 neurology    Labs/ Test Results Reviewed:  TSH          1/11/2024    11:45   TSH   TSH 1.48          Details          This result is from an external source.              Most Recent A1C          1/11/2024    11:45   HGBA1C Most Recent   Hemoglobin A1C 6.5          Details          This result is from an external source.           TSH normal A1c prediabetic range          ASSESSMENT AND PLAN:   Witnessed apnea: patient's symptoms and physical examination are concerning for possible sleep apnea.   I discussed the signs, symptoms, and pathophysiology of sleep apnea with this patient.  I also discussed the potential complications of untreated sleep apnea including but not limited to resistant hypertension, insulin resistance, pulmonary hypertension, atrial fibrillation, heart attack, stroke, nonrestorative sleep with hypersomnia which can increase risk for motor vehicle accidents, etc.   Different testing methods including home-based and lab based sleep studies were discussed with this patient.   Based on patient history and physical examination, will proceed with home sleep study.  The order for the sleep study is placed in [facility].  The test will be scheduled after prior authorization has been obtained through patient's insurance.  Treatment and management will be discussed in more detail with this patient  after the test is completed.  All questions were answered to patient's satisfaction.   Snoring: snoring is the sound created by turbulent airflow vibrating upper airway soft tissue.  I have also discussed factors affecting snoring including sleep deprivation, sleeping on the back and alcohol ingestion. To minimize snoring, patient is advised to have adequate sleep, sleep on their side, and avoid alcohol and sedative medications around bedtime.   Excessive daytime sleepiness:  [place] Sleepiness Scale of Total score: 19.  There are many causes of excessive daytime sleepiness.  Rule out sleep apnea as a contributing factor, as above.  Do not drive, operate heavy machinery, or do activities that require high concentration if feeling tired/drowsy.  Obesity: Body mass index is 38.94 kg/m².. Patients who are overweight or obese are at increased risk of sleep apnea/ sleep disordered breathing. Weight reduction and healthy lifestyle are encouraged in overweight/ obese patients as part of a comprehensive approach to sleep apnea treatment.    Restless leg syndrome: Improved with medication however ferritin from January 2024 was low at 21 and likely contributing to restless leg symptoms.  Advised iron supplementation ferrous sulfate 324 mg a day for 3 months with plans to recheck ferritin in 3 months.  Treating ferritin level may help get her off of some prescription medication for RLS such as ropinirole.  Untreated DIEGO can also contribute to restless leg symptoms.    I have also discussed with the patient the following  Sleep hygiene: try to maintain a regular bed time and wake time, avoid watching TV/ using electronic devices in bed (including cell phones), limit caffeinated and alcoholic beverages before bed, try to maintain a cool and quiet sleep environment, avoid daytime naps  Adequate amount of sleep: most people need around 7 to 8 hours of sleep each night      Patient will follow-up after study, 31 to 90 days after  PAP therapy initiated if applicable, or contact the office sooner for questions or concerns. Patient's questions were answered.      Thank you for allowing me to participate in your patient's care.    Smita Holland MD  Sleep Medicine  04/23/24  10:17 EDT

## 2024-05-20 ENCOUNTER — HOSPITAL ENCOUNTER (OUTPATIENT)
Dept: SLEEP MEDICINE | Facility: HOSPITAL | Age: 59
Discharge: HOME | End: 2024-05-20
Admitting: FAMILY MEDICINE
Payer: MEDICARE

## 2024-05-20 DIAGNOSIS — G25.81 RLS (RESTLESS LEGS SYNDROME): ICD-10-CM

## 2024-05-20 DIAGNOSIS — E66.9 OBESITY (BMI 30-39.9): ICD-10-CM

## 2024-05-20 DIAGNOSIS — R06.83 SNORING: ICD-10-CM

## 2024-05-20 DIAGNOSIS — G47.10 HYPERSOMNIA: ICD-10-CM

## 2024-05-20 DIAGNOSIS — G47.30 SLEEP APNEA, UNSPECIFIED TYPE: ICD-10-CM

## 2024-05-20 DIAGNOSIS — G47.8 NON-RESTORATIVE SLEEP: ICD-10-CM

## 2024-05-20 PROCEDURE — 95806 SLEEP STUDY UNATT&RESP EFFT: CPT

## 2024-05-31 DIAGNOSIS — G47.33 OBSTRUCTIVE SLEEP APNEA: Primary | ICD-10-CM

## 2024-06-04 ENCOUNTER — TELEPHONE (OUTPATIENT)
Dept: SLEEP MEDICINE | Facility: HOSPITAL | Age: 59
End: 2024-06-04
Payer: MEDICARE

## 2024-06-06 ENCOUNTER — TELEPHONE (OUTPATIENT)
Dept: SLEEP MEDICINE | Facility: HOSPITAL | Age: 59
End: 2024-06-06
Payer: MEDICARE

## (undated) DEVICE — REDUCER: Brand: ENDOWRIST

## (undated) DEVICE — DAVINCI-LF: Brand: MEDLINE INDUSTRIES, INC.

## (undated) DEVICE — GLV SURG SENSICARE SLT PF LF 7.5 STRL

## (undated) DEVICE — SYR LL TP 10ML STRL

## (undated) DEVICE — GLV SURG SENSICARE PI PF LF 7 GRN STRL

## (undated) DEVICE — LAPAROVUE VISIBILITY SYSTEM LAPAROSCOPIC SOLUTIONS: Brand: LAPAROVUE

## (undated) DEVICE — SUCTION/IRRIGATOR: Brand: ENDOWRIST;DAVINCI SI

## (undated) DEVICE — BNDG ESMARK 4IN 12FT LF STRL BLU

## (undated) DEVICE — TISSUE RETRIEVAL SYSTEM: Brand: INZII RETRIEVAL SYSTEM

## (undated) DEVICE — DRSNG WND GZ CURAD OIL EMULSION 3X3IN STRL

## (undated) DEVICE — SUT VIC PLS CTD BR 0 TIE 18IN VIL

## (undated) DEVICE — SLV SCD KN/LEN ADJ EXPRSS BLENDED MD 1P/U

## (undated) DEVICE — GAUZE,SPONGE,4"X4",16PLY,STRL,LF,10/TRAY: Brand: MEDLINE

## (undated) DEVICE — SEAL

## (undated) DEVICE — NDL HYPO ECLPS SFTY 22G 1IN

## (undated) DEVICE — TIP COVER ACCESSORY

## (undated) DEVICE — ENDOPATH XCEL WITH OPTIVIEW TECHNOLOGY BLADELESS TROCARS WITH STABILITY SLEEVES: Brand: ENDOPATH XCEL OPTIVIEW

## (undated) DEVICE — ARM DRAPE

## (undated) DEVICE — BNDG ELAS ECON W/CLIP 4IN 5YD LF STRL

## (undated) DEVICE — STERILE POLYISOPRENE POWDER-FREE SURGICAL GLOVES WITH EMOLLIENT COATING: Brand: PROTEXIS

## (undated) DEVICE — SOL IRR NACL 0.9PCT BT 1000ML

## (undated) DEVICE — GLV SURG SENSICARE SLT PF LF 8.5 STRL

## (undated) DEVICE — 2, DISPOSABLE SUCTION/IRRIGATOR WITHOUT DISPOSABLE TIP: Brand: STRYKEFLOW

## (undated) DEVICE — ENDOPATH PNEUMONEEDLE INSUFFLATION NEEDLES WITH LUER LOCK CONNECTORS 120MM: Brand: ENDOPATH

## (undated) DEVICE — DISPOSABLE TOURNIQUET CUFF SINGLE BLADDER, SINGLE PORT AND QUICK CONNECT CONNECTOR: Brand: COLOR CUFF

## (undated) DEVICE — CANNULA SEAL

## (undated) DEVICE — INTENDED FOR TISSUE SEPARATION, AND OTHER PROCEDURES THAT REQUIRE A SHARP SURGICAL BLADE TO PUNCTURE OR CUT.: Brand: BARD-PARKER ® CARBON RIB-BACK BLADES

## (undated) DEVICE — GLV SURG BIOGEL LTX PF 8 1/2

## (undated) DEVICE — VAGINAL PREP TRAY: Brand: MEDLINE INDUSTRIES, INC.

## (undated) DEVICE — GLV SURG SENSICARE SLT PF LF 7 STRL

## (undated) DEVICE — ANTIBACTERIAL UNDYED BRAIDED (POLYGLACTIN 910), SYNTHETIC ABSORBABLE SUTURE: Brand: COATED VICRYL

## (undated) DEVICE — SUT ETHLN 4/0 FS2 18IN 662H

## (undated) DEVICE — SOL NACL 0.9PCT 1000ML

## (undated) DEVICE — UNDERCAST PADDING: Brand: DEROYAL

## (undated) DEVICE — 3 RING SUTURE PASSER - 16 CM: Brand: 3 RING SUTURE PASSER - 16 CM

## (undated) DEVICE — GLV SURG SENSICARE SLT PF LF 6.5 STRL

## (undated) DEVICE — EXTREMITY-LF: Brand: MEDLINE INDUSTRIES, INC.